# Patient Record
Sex: FEMALE | Race: BLACK OR AFRICAN AMERICAN | NOT HISPANIC OR LATINO | Employment: UNEMPLOYED | URBAN - METROPOLITAN AREA
[De-identification: names, ages, dates, MRNs, and addresses within clinical notes are randomized per-mention and may not be internally consistent; named-entity substitution may affect disease eponyms.]

---
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
## 2024-04-04 ENCOUNTER — HOSPITAL ENCOUNTER (EMERGENCY)
Facility: HOSPITAL | Age: 19
Discharge: HOME | End: 2024-04-05
Attending: EMERGENCY MEDICINE
Payer: MEDICAID

## 2024-04-04 DIAGNOSIS — Z20.2 SEXUALLY TRANSMITTED DISEASE EXPOSURE: ICD-10-CM

## 2024-04-04 DIAGNOSIS — N89.8 VAGINAL DISCHARGE: Primary | ICD-10-CM

## 2024-04-04 PROCEDURE — 99284 EMERGENCY DEPT VISIT MOD MDM: CPT

## 2024-04-05 VITALS
RESPIRATION RATE: 18 BRPM | DIASTOLIC BLOOD PRESSURE: 62 MMHG | OXYGEN SATURATION: 98 % | BODY MASS INDEX: 19.49 KG/M2 | TEMPERATURE: 98 F | SYSTOLIC BLOOD PRESSURE: 110 MMHG | HEART RATE: 74 BPM | WEIGHT: 110 LBS | HEIGHT: 63 IN

## 2024-04-05 LAB
APPEARANCE UR: ABNORMAL
B-HCG SERPL QL: NEGATIVE
BACTERIA #/AREA URNS AUTO: ABNORMAL /HPF
BILIRUB UR QL STRIP.AUTO: NEGATIVE
C TRACH DNA SPEC QL NAA+PROBE: NOT DETECTED
COLOR UR AUTO: YELLOW
GLUCOSE UR QL STRIP.AUTO: NORMAL
HYALINE CASTS #/AREA URNS LPF: ABNORMAL /LPF
KETONES UR QL STRIP.AUTO: NEGATIVE
LEUKOCYTE ESTERASE UR QL STRIP.AUTO: 500
MUCOUS THREADS URNS QL MICRO: ABNORMAL /LPF
N GONORRHOEA DNA SPEC QL NAA+PROBE: NOT DETECTED
NITRITE UR QL STRIP.AUTO: NEGATIVE
PH UR STRIP.AUTO: 7 [PH]
PROT UR QL STRIP.AUTO: ABNORMAL
RBC #/AREA URNS AUTO: ABNORMAL /HPF
RBC UR QL AUTO: ABNORMAL
SOURCE (OHS): NORMAL
SP GR UR STRIP.AUTO: 1.03 (ref 1–1.03)
SQUAMOUS #/AREA URNS LPF: ABNORMAL /HPF
UROBILINOGEN UR STRIP-ACNC: 2
WBC #/AREA URNS AUTO: ABNORMAL /HPF

## 2024-04-05 PROCEDURE — 96372 THER/PROPH/DIAG INJ SC/IM: CPT | Performed by: EMERGENCY MEDICINE

## 2024-04-05 PROCEDURE — 81001 URINALYSIS AUTO W/SCOPE: CPT | Performed by: STUDENT IN AN ORGANIZED HEALTH CARE EDUCATION/TRAINING PROGRAM

## 2024-04-05 PROCEDURE — 87086 URINE CULTURE/COLONY COUNT: CPT | Performed by: STUDENT IN AN ORGANIZED HEALTH CARE EDUCATION/TRAINING PROGRAM

## 2024-04-05 PROCEDURE — 25000003 PHARM REV CODE 250: Performed by: EMERGENCY MEDICINE

## 2024-04-05 PROCEDURE — 63600175 PHARM REV CODE 636 W HCPCS: Performed by: EMERGENCY MEDICINE

## 2024-04-05 PROCEDURE — 81025 URINE PREGNANCY TEST: CPT | Performed by: STUDENT IN AN ORGANIZED HEALTH CARE EDUCATION/TRAINING PROGRAM

## 2024-04-05 PROCEDURE — 87491 CHLMYD TRACH DNA AMP PROBE: CPT | Performed by: STUDENT IN AN ORGANIZED HEALTH CARE EDUCATION/TRAINING PROGRAM

## 2024-04-05 RX ORDER — METRONIDAZOLE 500 MG/1
500 TABLET ORAL EVERY 12 HOURS
Qty: 14 TABLET | Refills: 0 | Status: SHIPPED | OUTPATIENT
Start: 2024-04-05 | End: 2024-04-12

## 2024-04-05 RX ORDER — DOXYCYCLINE 100 MG/1
100 CAPSULE ORAL 2 TIMES DAILY
Qty: 14 CAPSULE | Refills: 0 | Status: SHIPPED | OUTPATIENT
Start: 2024-04-05 | End: 2024-04-12

## 2024-04-05 RX ORDER — WATER FOR INJECTION,STERILE
VIAL (ML) INJECTION
Status: COMPLETED
Start: 2024-04-05 | End: 2024-04-05

## 2024-04-05 RX ORDER — DOXYCYCLINE HYCLATE 100 MG
100 TABLET ORAL
Status: COMPLETED | OUTPATIENT
Start: 2024-04-05 | End: 2024-04-05

## 2024-04-05 RX ORDER — CEFTRIAXONE 1 G/1
0.5 INJECTION, POWDER, FOR SOLUTION INTRAMUSCULAR; INTRAVENOUS
Status: COMPLETED | OUTPATIENT
Start: 2024-04-05 | End: 2024-04-05

## 2024-04-05 RX ADMIN — DOXYCYCLINE HYCLATE 100 MG: 100 TABLET, COATED ORAL at 02:04

## 2024-04-05 RX ADMIN — CEFTRIAXONE SODIUM 0.5 G: 1 INJECTION, POWDER, FOR SOLUTION INTRAMUSCULAR; INTRAVENOUS at 02:04

## 2024-04-05 NOTE — ED PROVIDER NOTES
Encounter Date: 4/4/2024       History     Chief Complaint   Patient presents with    Vaginal Discharge     Vaginal odor, clear discharge, irritation x 1 week. No burning when urinating. LMP unknown currently on depo.      19 yo F presents with painless foul smelling vagnial discharge for 1 week, +unprotected intercourse. Has irregular periods, on depot unknown lmp. Here for sti check    The history is provided by the patient. No  was used.     Review of patient's allergies indicates:  No Known Allergies  No past medical history on file.  No past surgical history on file.  No family history on file.     Review of Systems   Genitourinary:  Positive for vaginal discharge.   All other systems reviewed and are negative.      Physical Exam     Initial Vitals [04/04/24 2343]   BP Pulse Resp Temp SpO2   104/68 82 18 98.4 °F (36.9 °C) 99 %      MAP       --         Physical Exam    Nursing note and vitals reviewed.  Constitutional: She appears well-developed and well-nourished. She is not diaphoretic. No distress.   HENT:   Head: Normocephalic and atraumatic.   Nose: Nose normal.   Mouth/Throat: Oropharynx is clear and moist.   Eyes: Conjunctivae and EOM are normal. Pupils are equal, round, and reactive to light.   Neck: Trachea normal. Neck supple.   Normal range of motion.  Cardiovascular:  Normal rate, regular rhythm, normal heart sounds and intact distal pulses.           No murmur heard.  Pulmonary/Chest: Breath sounds normal. No respiratory distress. She has no wheezes. She has no rhonchi. She has no rales. She exhibits no tenderness.   Abdominal: Abdomen is soft. Bowel sounds are normal. She exhibits no distension and no mass. There is no abdominal tenderness. There is no rebound and no guarding.   Genitourinary:    Genitourinary Comments: See ed course     Musculoskeletal:         General: No tenderness or edema. Normal range of motion.      Cervical back: Normal range of motion and neck supple.       Lumbar back: Normal. Normal range of motion.     Neurological: She is alert and oriented to person, place, and time. She has normal strength. No cranial nerve deficit or sensory deficit.   Skin: Skin is warm and dry. Capillary refill takes less than 2 seconds. No abscess noted. No erythema. No pallor.   Psychiatric: She has a normal mood and affect. Her behavior is normal. Judgment and thought content normal.         ED Course   Procedures  Labs Reviewed   URINALYSIS, REFLEX TO URINE CULTURE - Abnormal; Notable for the following components:       Result Value    Appearance, UA Turbid (*)     Protein, UA Trace (*)     Blood, UA 2+ (*)     Urobilinogen, UA 2.0 (*)     Leukocyte Esterase,  (*)     WBC, UA 21-50 (*)     Bacteria, UA Occasional (*)     Squamous Epithelial Cells, UA Occasional (*)     Mucous, UA Trace (*)     Hyaline Casts, UA 0-2 (*)     All other components within normal limits   PREGNANCY TEST, URINE RAPID - Normal   CHLAMYDIA/GONORRHOEAE(GC), PCR    Narrative:     The Xpert CT/NG test, performed on the [employer] system is a qualitative in vitro real-time polymerase chain reaction (PCR) test for the automated detected and differentiation for genomic DNA from Chlamydia trachomatis (CT) and/or Neisseria gonorrhoeae (NG).   CULTURE, URINE          Imaging Results    None          Medications   cefTRIAXone injection 0.5 g (0.5 g Intramuscular Given 4/5/24 0228)   doxycycline tablet 100 mg (100 mg Oral Given 4/5/24 0228)   sterile water for injection injection (  Override Pull 4/5/24 0230)     Medical Decision Making  Given patient's presentation, differential diagnosis includes but is not limited to uti, pregnancy, sti  To evaluate these  possible etiologies ua, upt, gc/cz were ordered and reviewed  Urine contaminated  Treated ofr sti exposure  No evidence of pid on exam    Problems Addressed:  Sexually transmitted disease exposure: acute illness or injury that poses a threat to life or bodily  functions  Vaginal discharge: acute illness or injury that poses a threat to life or bodily functions    Amount and/or Complexity of Data Reviewed  External Data Reviewed: notes.     Details: Previous urgent care visits for sti eval  Labs: ordered.    Risk  OTC drugs.  Prescription drug management.               ED Course as of 04/05/24 0259   Fri Apr 05, 2024 0142 Pelvic exam performed with [facility] ERT as chaperone, foul smelling brownish discharge with no CMT [BS]      ED Course User Index  [BS] Adwoa Hills MD                           Clinical Impression:  Final diagnoses:  [N89.8] Vaginal discharge (Primary)  [Z20.2] Sexually transmitted disease exposure          ED Disposition Condition    Discharge Stable          ED Prescriptions       Medication Sig Dispense Start Date End Date Auth. Provider    doxycycline (VIBRAMYCIN) 100 MG Cap Take 1 capsule (100 mg total) by mouth 2 (two) times daily. for 7 days 14 capsule 4/5/2024 4/12/2024 Adwoa Hills MD    metroNIDAZOLE (FLAGYL) 500 MG tablet Take 1 tablet (500 mg total) by mouth every 12 (twelve) hours. for 7 days 14 tablet 4/5/2024 4/12/2024 Adwoa Hills MD          Follow-up Information       Follow up With Specialties Details Why Contact Info    Howie Hernandez MD Pediatrics Schedule an appointment as soon as possible for a visit   [address] [place] [id number] [phone]      [facility] - Emergency Dept Emergency Medicine  As needed, If symptoms worsen [address] [facility] [id number] [phone]             Adwoa Hills MD  04/05/24 0259

## 2024-04-07 LAB — BACTERIA UR CULT: NORMAL

## 2024-04-29 ENCOUNTER — HOSPITAL ENCOUNTER (EMERGENCY)
Facility: HOSPITAL | Age: 19
Discharge: HOME | End: 2024-04-29
Attending: STUDENT IN AN ORGANIZED HEALTH CARE EDUCATION/TRAINING PROGRAM
Payer: MEDICAID

## 2024-04-29 VITALS
WEIGHT: 110 LBS | TEMPERATURE: 100 F | DIASTOLIC BLOOD PRESSURE: 70 MMHG | SYSTOLIC BLOOD PRESSURE: 118 MMHG | BODY MASS INDEX: 20.24 KG/M2 | OXYGEN SATURATION: 97 % | RESPIRATION RATE: 18 BRPM | HEIGHT: 62 IN | HEART RATE: 94 BPM

## 2024-04-29 DIAGNOSIS — L02.31 ABSCESS OF RIGHT BUTTOCK: Primary | ICD-10-CM

## 2024-04-29 PROCEDURE — 25000003 PHARM REV CODE 250

## 2024-04-29 PROCEDURE — 99284 EMERGENCY DEPT VISIT MOD MDM: CPT | Mod: 25

## 2024-04-29 PROCEDURE — 10060 I&D ABSCESS SIMPLE/SINGLE: CPT

## 2024-04-29 RX ORDER — LIDOCAINE HYDROCHLORIDE 10 MG/ML
5 INJECTION, SOLUTION EPIDURAL; INFILTRATION; INTRACAUDAL; PERINEURAL
Status: COMPLETED | OUTPATIENT
Start: 2024-04-29 | End: 2024-04-29

## 2024-04-29 RX ORDER — SULFAMETHOXAZOLE AND TRIMETHOPRIM 800; 160 MG/1; MG/1
1 TABLET ORAL 2 TIMES DAILY
Qty: 14 TABLET | Refills: 0 | Status: SHIPPED | OUTPATIENT
Start: 2024-04-29 | End: 2024-05-06

## 2024-04-29 RX ORDER — DICLOFENAC SODIUM 75 MG/1
75 TABLET, DELAYED RELEASE ORAL 2 TIMES DAILY PRN
Qty: 20 TABLET | Refills: 0 | Status: SHIPPED | OUTPATIENT
Start: 2024-04-29

## 2024-04-29 RX ORDER — TRAMADOL HYDROCHLORIDE 50 MG/1
50 TABLET ORAL EVERY 6 HOURS PRN
Qty: 12 TABLET | Refills: 0 | Status: SHIPPED | OUTPATIENT
Start: 2024-04-29 | End: 2024-05-02

## 2024-04-29 RX ADMIN — LIDOCAINE HYDROCHLORIDE 50 MG: 10 INJECTION, SOLUTION EPIDURAL; INFILTRATION; INTRACAUDAL; PERINEURAL at 10:04

## 2024-04-30 NOTE — ED NOTES
Lobby Round. Pt alert and oriented at this time in main lobby. Vitals assessed. Pt reports no changes from initial triage. Pt does not appear to be in any immediate distress at this time.

## 2024-04-30 NOTE — ED PROVIDER NOTES
"Encounter Date: 4/29/2024       History     Chief Complaint   Patient presents with    Abscess     Boil to "crack" of buttocks noticed yesterday. Denies fever or drainage     The patient is a 18 y.o. female who presents to the Emergency Department with a chief complaint of abscess to right buttock. Symptoms began 3 days ago and have been constant since onset. Her pain is currently rated as a 6/10 in severity and described as aching with no radiation. Associated symptoms include nothing. Symptoms are aggravated with movement and there are no alleviating factors. The patient denies fever, chills, or drainage. She reports taking nothing prior to arrival with no relief of symptoms. No other reported symptoms at this time.      The history is provided by the patient. No  was used.   Abscess   This is a new problem. The current episode started several days ago. The problem occurs rarely. The problem has been unchanged. The abscess is present on the right buttock. The pain is at a severity of 6/10. Pertinent negatives include no fever and no sore throat. She has received no recent medical care.     Review of patient's allergies indicates:  No Known Allergies  No past medical history on file.  No past surgical history on file.  No family history on file.  Social History     Tobacco Use    Smoking status: Never    Smokeless tobacco: Never     Review of Systems   Constitutional:  Negative for fever.   HENT:  Negative for sore throat.    Respiratory:  Negative for shortness of breath.    Cardiovascular:  Negative for chest pain.   Gastrointestinal:  Negative for nausea.   Genitourinary:  Negative for dysuria.   Musculoskeletal:  Negative for back pain.   Skin:  Negative for rash.        Buttock abscess   Neurological:  Negative for weakness.   Hematological:  Does not bruise/bleed easily.   All other systems reviewed and are negative.      Physical Exam     Initial Vitals [04/29/24 2014]   BP Pulse Resp " Temp SpO2   99/68 102 20 99.6 °F (37.6 °C) 97 %      MAP       --         Physical Exam    Nursing note and vitals reviewed.  Constitutional: She appears well-developed and well-nourished.   HENT:   Head: Normocephalic.   Right Ear: Hearing and tympanic membrane normal.   Left Ear: Hearing and tympanic membrane normal.   Mouth/Throat: Uvula is midline, oropharynx is clear and moist and mucous membranes are normal.   Cardiovascular:  Normal rate, regular rhythm, normal heart sounds and normal pulses.           Pulmonary/Chest: Effort normal and breath sounds normal.     Neurological: She is alert. GCS eye subscore is 4. GCS verbal subscore is 5. GCS motor subscore is 6.   Skin: Skin is warm, dry and intact. Capillary refill takes less than 2 seconds.   Raised area of fluctuance to right lower buttock on along medial aspect. There is no drainage present.         ED Course   I & D - Incision and Drainage    Date/Time: 4/29/2024 10:40 PM  Location procedure was performed: [facility] EMERGENCY DEPARTMENT    Performed by: Shayy Caldwell NP  Authorized by: Shayy Caldwell NP  Type: abscess  Location: Right buttock.    Anesthesia:  Local Anesthetic: lidocaine 1% without epinephrine  Scalpel size: 11  Incision type: single straight  Complexity: simple  Drainage: pus  Drainage amount: copious  Wound treatment: incision and drainage  Packing material: 1/4 in iodoform gauze  Complications: No  Specimens: No  Implants: No  Patient tolerance: Patient tolerated the procedure well with no immediate complications        Labs Reviewed - No data to display       Imaging Results    None          Medications   LIDOcaine (PF) 10 mg/ml (1%) injection 50 mg (has no administration in time range)     Medical Decision Making  The patient is a 18 y.o. female who presents to the Emergency Department with a chief complaint of abscess to right buttock. Symptoms began 3 days ago and have been constant since onset. Her pain is currently rated as a  6/10 in severity and described as aching with no radiation. Associated symptoms include nothing. Symptoms are aggravated with movement and there are no alleviating factors. The patient denies fever, chills, or drainage. She reports taking nothing prior to arrival with no relief of symptoms. No other reported symptoms at this time.      Differential diagnosis include but are not limited to abscess, folliculitis     Problems Addressed:  Abscess of right buttock: acute illness or injury    Risk  Prescription drug management.               ED Course as of 04/29/24 2258 Mon Apr 29, 2024 2254 I&D performed. Patient tolerated will. Copious amount of purulent drainage. Site packed [LM]      ED Course User Index  [LM] Shayy Caldwell NP                           Clinical Impression:  Final diagnoses:  [L02.31] Abscess of right buttock (Primary)          ED Disposition Condition    Discharge Stable          ED Prescriptions       Medication Sig Dispense Start Date End Date Auth. Provider    sulfamethoxazole-trimethoprim 800-160mg (BACTRIM DS) 800-160 mg Tab Take 1 tablet by mouth 2 (two) times daily. for 7 days 14 tablet 4/29/2024 5/6/2024 Shayy Caldwell NP    diclofenac (VOLTAREN) 75 MG EC tablet Take 1 tablet (75 mg total) by mouth 2 (two) times daily as needed (Pain). 20 tablet 4/29/2024 -- Shayy Caldwell NP    traMADoL (ULTRAM) 50 mg tablet Take 1 tablet (50 mg total) by mouth every 6 (six) hours as needed for Pain. 12 tablet 4/29/2024 5/2/2024 Shayy Caldwell NP          Follow-up Information       Follow up With Specialties Details Why Contact Info    Howie Hernandez MD Pediatrics Schedule an appointment as soon as possible for a visit   [address] [place] [address] [phone]               Shayy Caldwell NP  04/29/24 3915

## 2024-06-27 ENCOUNTER — HOSPITAL ENCOUNTER (EMERGENCY)
Facility: HOSPITAL | Age: 19
Discharge: HOME | End: 2024-06-28
Attending: FAMILY MEDICINE
Payer: MEDICAID

## 2024-06-27 DIAGNOSIS — A54.9 GONORRHEA: Primary | ICD-10-CM

## 2024-06-27 LAB
B-HCG UR QL: NEGATIVE
BACTERIA #/AREA URNS AUTO: ABNORMAL /HPF
BILIRUB UR QL STRIP.AUTO: NEGATIVE
C TRACH DNA SPEC QL NAA+PROBE: NOT DETECTED
CLARITY UR: CLEAR
CLUE CELLS VAG QL WET PREP: NORMAL
COLOR UR AUTO: YELLOW
CTP QC/QA: YES
GLUCOSE UR QL STRIP: NORMAL
HGB UR QL STRIP: NEGATIVE
HYALINE CASTS #/AREA URNS LPF: ABNORMAL /LPF
KETONES UR QL STRIP: NEGATIVE
LEUKOCYTE ESTERASE UR QL STRIP: NEGATIVE
MUCOUS THREADS URNS QL MICRO: ABNORMAL /LPF
N GONORRHOEA DNA SPEC QL NAA+PROBE: DETECTED
NITRITE UR QL STRIP: NEGATIVE
PH UR STRIP: 7 [PH]
PROT UR QL STRIP: ABNORMAL
RBC #/AREA URNS AUTO: ABNORMAL /HPF
SOURCE (OHS): ABNORMAL
SP GR UR STRIP.AUTO: 1.03 (ref 1–1.03)
SQUAMOUS #/AREA URNS LPF: ABNORMAL /HPF
T VAGINALIS VAG QL WET PREP: NORMAL
UROBILINOGEN UR STRIP-ACNC: NORMAL
WBC #/AREA URNS AUTO: ABNORMAL /HPF
WBC #/AREA VAG WET PREP: NORMAL
YEAST SPEC QL WET PREP: NORMAL

## 2024-06-27 PROCEDURE — 87491 CHLMYD TRACH DNA AMP PROBE: CPT | Performed by: NURSE PRACTITIONER

## 2024-06-27 PROCEDURE — 99284 EMERGENCY DEPT VISIT MOD MDM: CPT

## 2024-06-27 PROCEDURE — 81025 URINE PREGNANCY TEST: CPT | Performed by: NURSE PRACTITIONER

## 2024-06-27 PROCEDURE — 81001 URINALYSIS AUTO W/SCOPE: CPT | Performed by: NURSE PRACTITIONER

## 2024-06-27 PROCEDURE — 87591 N.GONORRHOEAE DNA AMP PROB: CPT | Performed by: NURSE PRACTITIONER

## 2024-06-27 PROCEDURE — 87210 SMEAR WET MOUNT SALINE/INK: CPT | Performed by: NURSE PRACTITIONER

## 2024-06-27 RX ORDER — CEFTRIAXONE 500 MG/1
500 INJECTION, POWDER, FOR SOLUTION INTRAMUSCULAR; INTRAVENOUS
Status: COMPLETED | OUTPATIENT
Start: 2024-06-28 | End: 2024-06-28

## 2024-06-27 RX ORDER — LIDOCAINE HYDROCHLORIDE 10 MG/ML
2 INJECTION, SOLUTION EPIDURAL; INFILTRATION; INTRACAUDAL; PERINEURAL
Status: COMPLETED | OUTPATIENT
Start: 2024-06-28 | End: 2024-06-28

## 2024-06-28 VITALS
OXYGEN SATURATION: 99 % | DIASTOLIC BLOOD PRESSURE: 65 MMHG | WEIGHT: 107.38 LBS | SYSTOLIC BLOOD PRESSURE: 102 MMHG | HEIGHT: 62 IN | HEART RATE: 73 BPM | BODY MASS INDEX: 19.76 KG/M2 | TEMPERATURE: 98 F | RESPIRATION RATE: 20 BRPM

## 2024-06-28 PROCEDURE — 63600175 PHARM REV CODE 636 W HCPCS: Performed by: NURSE PRACTITIONER

## 2024-06-28 PROCEDURE — 25000003 PHARM REV CODE 250: Performed by: NURSE PRACTITIONER

## 2024-06-28 PROCEDURE — 96372 THER/PROPH/DIAG INJ SC/IM: CPT | Performed by: NURSE PRACTITIONER

## 2024-06-28 RX ORDER — DOXYCYCLINE 100 MG/1
100 CAPSULE ORAL EVERY 12 HOURS
Qty: 14 CAPSULE | Refills: 0 | Status: SHIPPED | OUTPATIENT
Start: 2024-06-28 | End: 2024-07-05

## 2024-06-28 RX ADMIN — LIDOCAINE HYDROCHLORIDE 20 MG: 10 INJECTION, SOLUTION EPIDURAL; INFILTRATION; INTRACAUDAL; PERINEURAL at 12:06

## 2024-06-28 RX ADMIN — CEFTRIAXONE SODIUM 500 MG: 500 INJECTION, POWDER, FOR SOLUTION INTRAMUSCULAR; INTRAVENOUS at 12:06

## 2024-06-28 NOTE — ED PROVIDER NOTES
Encounter Date: 6/27/2024       History     Chief Complaint   Patient presents with    Exposure to STD     The patient presents with vaginal discharge.  The onset was 3 days ago.  The course/duration of symptoms is constant.  Location: vagina. Radiating pain: none. The character of symptoms is discharge: white.  The degree of symptoms is minimal.  Risk factors consist of none.  Prior episodes: none.  Therapy today: none.  Reports unprotected intercourse. Associated symptoms: denies fever, denies chills, denies nausea, denies vomiting, denies abdominal pain, denies dysuria and denies rash. Additional history: none.         Review of patient's allergies indicates:  No Known Allergies  History reviewed. No pertinent past medical history.  History reviewed. No pertinent surgical history.  No family history on file.  Social History     Tobacco Use    Smoking status: Never    Smokeless tobacco: Never     Review of Systems   Constitutional:  Negative for fever.   HENT:  Negative for sore throat.    Respiratory:  Negative for shortness of breath.    Cardiovascular:  Negative for chest pain.   Gastrointestinal:  Negative for nausea.   Genitourinary:  Positive for vaginal discharge. Negative for dysuria.   Musculoskeletal:  Negative for back pain.   Skin:  Negative for rash.   Neurological:  Negative for weakness.   Hematological:  Does not bruise/bleed easily.   All other systems reviewed and are negative.      Physical Exam     Initial Vitals [06/27/24 2134]   BP Pulse Resp Temp SpO2   102/65 73 20 98.3 °F (36.8 °C) 99 %      MAP       --         Physical Exam    Nursing note and vitals reviewed.  Constitutional: She appears well-developed and well-nourished.   HENT:   Head: Normocephalic and atraumatic.   Neck: Neck supple.   Normal range of motion.  Cardiovascular:  Normal rate, regular rhythm, normal heart sounds and intact distal pulses.           Pulmonary/Chest: Effort normal and breath sounds normal.   Abdominal:  Abdomen is soft and flat. Bowel sounds are normal. There is no abdominal tenderness.   Genitourinary:    Genitourinary Comments: Pelvic exam deferred at patient's request, she wishes to self swab     Musculoskeletal:         General: Normal range of motion.      Cervical back: Normal range of motion and neck supple.     Neurological: She is alert. She has normal strength.   Skin: Skin is warm and dry.   Psychiatric: She has a normal mood and affect.         ED Course   Procedures  Labs Reviewed   CHLAMYDIA/GONORRHOEAE(GC), PCR - Abnormal; Notable for the following components:       Result Value    N. gonorrhea PCR Detected (*)     All other components within normal limits    Narrative:     The Xpert CT/NG test, performed on the [facility] system is a qualitative in vitro real-time polymerase chain reaction (PCR) test for the automated detected and differentiation for genomic DNA from Chlamydia trachomatis (CT) and/or Neisseria gonorrhoeae (NG).   URINALYSIS, REFLEX TO URINE CULTURE - Abnormal; Notable for the following components:    Protein, UA Trace (*)     Squamous Epithelial Cells, UA Many (*)     Mucous, UA Moderate (*)     All other components within normal limits   WET PREP, GENITAL - Normal   POCT URINE PREGNANCY          Imaging Results    None          Medications   LIDOcaine (PF) 10 mg/ml (1%) injection 20 mg (has no administration in time range)   cefTRIAXone injection 500 mg (has no administration in time range)     Medical Decision Making  The patient presents with vaginal discharge.  The onset was 3 days ago.  The course/duration of symptoms is constant.  Location: vagina. Radiating pain: none. The character of symptoms is discharge: white.  The degree of symptoms is minimal.  Risk factors consist of none.  Prior episodes: none.  Therapy today: none.  Reports unprotected intercourse. Associated symptoms: denies fever, denies chills, denies nausea, denies vomiting, denies abdominal pain, denies dysuria  and denies rash. Additional history: none.     12:08 AM DISPOSITION: The patient is resting comfortably in no acute distress.  She is hemodynamically stable and is without objective evidence for acute process requiring urgent intervention or hospitalization. I provided counseling to patient with regard to condition, the treatment plan, specific conditions for return, and the importance of follow up. Detailed written and verbal instructions provided to patient and she expressed a verbal understanding of the discharge instructions and overall management plan. Reiterated the importance of medication administration and safety and advised patient to follow up with primary care provider in 3-5 days or sooner if needed.  Answered questions at this time. The patient is stable for discharge.       Amount and/or Complexity of Data Reviewed  Labs: ordered.    Risk  Prescription drug management.      Additional MDM:   Differential Diagnosis:   At this time differential diagnosis is but not limited to gonorrhea, chlamydia, trichomoniasis, candidiasis                ED Course as of 06/28/24 0008   Fri Jun 28, 2024   0007 N. gonorrhea PCR(!): Detected [RB]   0007 Given strict ED return precautions. I have spoken with the patient and/or caregivers. I have explained the patient's condition, diagnoses and treatment plan based on the information available to me at this time. I have answered the patient's and/or caregiver's questions and addressed any concerns. The patient and/or caregivers have as good an understanding of the patient's diagnosis, condition and treatment plan as can be expected at this point. The vital signs have been stable. The patient's condition is stable and appropriate for discharge from the emergency department.      The patient will pursue further outpatient evaluation with the primary care physician or other designated or consulting physician as outlined in the discharge instructions. The patient and/or  caregivers are agreeable to this plan of care and follow-up instructions have been explained in detail. The patient and/or caregivers have received these instructions in written format and have expressed an understanding of the discharge instructions. The patient and/or caregivers are aware that any significant change in condition or worsening of symptoms should prompt an immediate return to this or the closest emergency department or a call to 911.      [RB]      ED Course User Index  [RB] Aaron Delong ACNP                           Clinical Impression:  Final diagnoses:  [A54.9] Gonorrhea (Primary)          ED Disposition Condition    Discharge Stable          ED Prescriptions       Medication Sig Dispense Start Date End Date Auth. Provider    doxycycline (VIBRAMYCIN) 100 MG Cap Take 1 capsule (100 mg total) by mouth every 12 (twelve) hours. for 7 days 14 capsule 6/28/2024 7/5/2024 Aaron Delong ACNP          Follow-up Information       Follow up With Specialties Details Why Contact Info    Howie Hernandez MD Pediatrics In 3 days  [address] [facility] [address]  [phone]      [facility] Emergency Medicine  If symptoms worsen [address] [address] [phone]    [facility]   follow up in 3-5 days [address] [facility], [facility] [place] [place] [address]. [phone]             Aaron Delong ACNP  06/28/24 0008

## 2024-07-30 ENCOUNTER — HOSPITAL ENCOUNTER (EMERGENCY)
Facility: HOSPITAL | Age: 19
Discharge: HOME | End: 2024-07-30
Attending: STUDENT IN AN ORGANIZED HEALTH CARE EDUCATION/TRAINING PROGRAM
Payer: MEDICAID

## 2024-07-30 VITALS
BODY MASS INDEX: 20.06 KG/M2 | RESPIRATION RATE: 16 BRPM | DIASTOLIC BLOOD PRESSURE: 67 MMHG | SYSTOLIC BLOOD PRESSURE: 107 MMHG | TEMPERATURE: 98 F | HEIGHT: 62 IN | HEART RATE: 64 BPM | OXYGEN SATURATION: 99 % | WEIGHT: 109 LBS

## 2024-07-30 DIAGNOSIS — R11.0 NAUSEA: Primary | ICD-10-CM

## 2024-07-30 DIAGNOSIS — R53.1 WEAKNESS: ICD-10-CM

## 2024-07-30 LAB
ALBUMIN SERPL-MCNC: 4.1 G/DL (ref 3.5–5)
ALBUMIN/GLOB SERPL: 1.5 RATIO (ref 1.1–2)
ALP SERPL-CCNC: 56 UNIT/L (ref 40–150)
ALT SERPL-CCNC: 8 UNIT/L (ref 0–55)
ANION GAP SERPL CALC-SCNC: 5 MEQ/L
AST SERPL-CCNC: 13 UNIT/L (ref 5–34)
B-HCG UR QL: NEGATIVE
BACTERIA #/AREA URNS AUTO: NORMAL /HPF
BASOPHILS # BLD AUTO: 0.04 X10(3)/MCL
BASOPHILS NFR BLD AUTO: 0.9 %
BILIRUB SERPL-MCNC: 0.6 MG/DL
BILIRUB UR QL STRIP.AUTO: NEGATIVE
BUN SERPL-MCNC: 9.9 MG/DL (ref 7–18.7)
CALCIUM SERPL-MCNC: 9.3 MG/DL (ref 8.4–10.2)
CHLORIDE SERPL-SCNC: 110 MMOL/L (ref 98–107)
CLARITY UR: CLEAR
CO2 SERPL-SCNC: 26 MMOL/L (ref 22–29)
COLOR UR AUTO: COLORLESS
CREAT SERPL-MCNC: 0.71 MG/DL (ref 0.55–1.02)
CREAT/UREA NIT SERPL: 14
EOSINOPHIL # BLD AUTO: 0.08 X10(3)/MCL (ref 0–0.9)
EOSINOPHIL NFR BLD AUTO: 1.8 %
ERYTHROCYTE [DISTWIDTH] IN BLOOD BY AUTOMATED COUNT: 12.6 % (ref 11.5–17)
GFR SERPLBLD CREATININE-BSD FMLA CKD-EPI: >60 ML/MIN/1.73/M2
GLOBULIN SER-MCNC: 2.7 GM/DL (ref 2.4–3.5)
GLUCOSE SERPL-MCNC: 76 MG/DL (ref 74–100)
GLUCOSE UR QL STRIP: NORMAL
HCT VFR BLD AUTO: 36.5 % (ref 37–47)
HGB BLD-MCNC: 11.8 G/DL (ref 12–16)
HGB UR QL STRIP: NEGATIVE
IMM GRANULOCYTES # BLD AUTO: 0.01 X10(3)/MCL (ref 0–0.04)
IMM GRANULOCYTES NFR BLD AUTO: 0.2 %
KETONES UR QL STRIP: NEGATIVE
LEUKOCYTE ESTERASE UR QL STRIP: NEGATIVE
LYMPHOCYTES # BLD AUTO: 1.05 X10(3)/MCL (ref 0.6–4.6)
LYMPHOCYTES NFR BLD AUTO: 23.4 %
MCH RBC QN AUTO: 29.2 PG (ref 27–31)
MCHC RBC AUTO-ENTMCNC: 32.3 G/DL (ref 33–36)
MCV RBC AUTO: 90.3 FL (ref 80–94)
MONOCYTES # BLD AUTO: 0.48 X10(3)/MCL (ref 0.1–1.3)
MONOCYTES NFR BLD AUTO: 10.7 %
NEUTROPHILS # BLD AUTO: 2.83 X10(3)/MCL (ref 2.1–9.2)
NEUTROPHILS NFR BLD AUTO: 63 %
NITRITE UR QL STRIP: NEGATIVE
NRBC BLD AUTO-RTO: 0 %
PH UR STRIP: 8 [PH]
PLATELET # BLD AUTO: 216 X10(3)/MCL (ref 130–400)
PMV BLD AUTO: 9.8 FL (ref 7.4–10.4)
POTASSIUM SERPL-SCNC: 3.9 MMOL/L (ref 3.5–5.1)
PROT SERPL-MCNC: 6.8 GM/DL (ref 6.4–8.3)
PROT UR QL STRIP: NEGATIVE
RBC # BLD AUTO: 4.04 X10(6)/MCL (ref 4.2–5.4)
RBC #/AREA URNS AUTO: NORMAL /HPF
SODIUM SERPL-SCNC: 141 MMOL/L (ref 136–145)
SP GR UR STRIP.AUTO: 1.01 (ref 1–1.03)
SQUAMOUS #/AREA URNS LPF: NORMAL /HPF
UROBILINOGEN UR STRIP-ACNC: NORMAL
WBC # BLD AUTO: 4.49 X10(3)/MCL (ref 4.5–11.5)
WBC #/AREA URNS AUTO: NORMAL /HPF

## 2024-07-30 PROCEDURE — 81025 URINE PREGNANCY TEST: CPT

## 2024-07-30 PROCEDURE — 85025 COMPLETE CBC W/AUTO DIFF WBC: CPT

## 2024-07-30 PROCEDURE — 81001 URINALYSIS AUTO W/SCOPE: CPT

## 2024-07-30 PROCEDURE — 99283 EMERGENCY DEPT VISIT LOW MDM: CPT

## 2024-07-30 PROCEDURE — 80053 COMPREHEN METABOLIC PANEL: CPT

## 2024-07-30 RX ORDER — ONDANSETRON HYDROCHLORIDE 2 MG/ML
4 INJECTION, SOLUTION INTRAVENOUS
Status: DISCONTINUED | OUTPATIENT
Start: 2024-07-30 | End: 2024-07-30

## 2024-07-31 NOTE — ED PROVIDER NOTES
Encounter Date: 7/30/2024       History     Chief Complaint   Patient presents with    Nausea     Pt states nausea, dizziness and weakness x1 week with headache. Pt denies any fever, vomiting, diarrhea, dysuria or other recent illnesses.      See MDM    The history is provided by the patient. No  was used.     Review of patient's allergies indicates:  No Known Allergies  Past Medical History:   Diagnosis Date    Known health problems: none      Past Surgical History:   Procedure Laterality Date    none       No family history on file.  Social History     Tobacco Use    Smoking status: Never    Smokeless tobacco: Never   Substance Use Topics    Alcohol use: Not Currently    Drug use: Not Currently     Review of Systems   Constitutional:  Negative for fever.   Respiratory:  Negative for cough and shortness of breath.    Cardiovascular:  Negative for chest pain.   Gastrointestinal:  Positive for nausea. Negative for abdominal pain.   Genitourinary:  Negative for difficulty urinating and dysuria.   Musculoskeletal:  Negative for gait problem.   Skin:  Negative for color change.   Neurological:  Positive for dizziness and weakness. Negative for speech difficulty and headaches.   Psychiatric/Behavioral:  Negative for hallucinations and suicidal ideas.    All other systems reviewed and are negative.      Physical Exam     Initial Vitals [07/30/24 1929]   BP Pulse Resp Temp SpO2   104/68 72 18 97.8 °F (36.6 °C) 99 %      MAP       --         Physical Exam    Nursing note and vitals reviewed.  Constitutional: She appears well-developed and well-nourished.   HENT:   Head: Normocephalic.   Eyes: EOM are normal.   Neck:   Normal range of motion.  Cardiovascular:  Normal rate, regular rhythm, normal heart sounds and intact distal pulses.           Pulmonary/Chest: Breath sounds normal. No respiratory distress.   Abdominal: Abdomen is soft. Bowel sounds are normal. There is no abdominal tenderness.    Musculoskeletal:         General: Normal range of motion.      Cervical back: Normal range of motion.     Neurological: She is alert and oriented to person, place, and time. She has normal strength.   Skin: Skin is warm and dry.   Psychiatric: She has a normal mood and affect. Her behavior is normal. Judgment and thought content normal.         ED Course   Procedures  Labs Reviewed   COMPREHENSIVE METABOLIC PANEL - Abnormal       Result Value    Sodium 141      Potassium 3.9      Chloride 110 (*)     CO2 26      Glucose 76      Blood Urea Nitrogen 9.9      Creatinine 0.71      Calcium 9.3      Protein Total 6.8      Albumin 4.1      Globulin 2.7      Albumin/Globulin Ratio 1.5      Bilirubin Total 0.6      ALP 56      ALT 8      AST 13      eGFR >60      Anion Gap 5.0      BUN/Creatinine Ratio 14     CBC WITH DIFFERENTIAL - Abnormal    WBC 4.49 (*)     RBC 4.04 (*)     Hgb 11.8 (*)     Hct 36.5 (*)     MCV 90.3      MCH 29.2      MCHC 32.3 (*)     RDW 12.6      Platelet 216      MPV 9.8      Neut % 63.0      Lymph % 23.4      Mono % 10.7      Eos % 1.8      Basophil % 0.9      Lymph # 1.05      Neut # 2.83      Mono # 0.48      Eos # 0.08      Baso # 0.04      IG# 0.01      IG% 0.2      NRBC% 0.0     URINALYSIS, REFLEX TO URINE CULTURE - Normal    Color, UA Colorless      Appearance, UA Clear      Specific Gravity, UA 1.015      pH, UA 8.0      Protein, UA Negative      Glucose, UA Normal      Ketones, UA Negative      Blood, UA Negative      Bilirubin, UA Negative      Urobilinogen, UA Normal      Nitrites, UA Negative      Leukocyte Esterase, UA Negative      RBC, UA 0-5      WBC, UA 0-5      Bacteria, UA None Seen      Squamous Epithelial Cells, UA Trace     PREGNANCY TEST, URINE RAPID - Normal    hCG Qualitative, Urine Negative     CBC W/ AUTO DIFFERENTIAL    Narrative:     The following orders were created for panel order CBC auto differential.  Procedure                               Abnormality          Status                     ---------                               -----------         ------                     CBC with Differential[[id number]]       Abnormal            Final result                 Please view results for these tests on the individual orders.          Imaging Results    None          Medications - No data to display  Medical Decision Making  Historian:  Patient.  Patient is a Black or  18 y.o. female that presents with nausea, dizziness, weakness that has been present 1 week. Associated symptoms nothing. Surrounding information is patient states that symptoms resolved and wanted to know if she was pregnant. Exacerbated by nothing. Relieved by nothing. Patient treatment prior to arrival nothing. Risk factors include none. Other history pertaining to this complaint none.   Assessment:  See physical exam.  DD:  Anemia, electrolyte imbalance, pregnancy  ED Course: History was obtained.  Physical was performed.  Labs were unremarkable.  Patient did not want fluids or Zofran. Medical or surgical consults:  None. Social determinants that affect healthcare:  None.       Amount and/or Complexity of Data Reviewed  Labs:      Details: Labs unremarkable                                      Clinical Impression:  Final diagnoses:  [R11.0] Nausea (Primary)  [R53.1] Weakness          ED Disposition Condition    Discharge Stable          ED Prescriptions    None       Follow-up Information       Follow up With Specialties Details Why Contact Info    Your Primary Care Provider  Call in 3 days ed follow up              Chico Lopez FNP  07/30/24 1333

## 2024-07-31 NOTE — FIRST PROVIDER EVALUATION
Medical screening examination initiated.  I have conducted a focused provider triage encounter, findings are as follows:    Brief history of present illness:  18 year old female presents to ER with c/o nausea, dizziness, and headache x 1 week.     There were no vitals filed for this visit.    Pertinent physical exam:  Awake and alert, NAD    Brief workup plan:  Labs, UA, UPT    Preliminary workup initiated; this workup will be continued and followed by the physician or advanced practice provider that is assigned to the patient when roomed.

## 2024-11-16 ENCOUNTER — HOSPITAL ENCOUNTER (EMERGENCY)
Facility: HOSPITAL | Age: 19
Discharge: HOME | End: 2024-11-16
Attending: EMERGENCY MEDICINE
Payer: MEDICAID

## 2024-11-16 VITALS
HEART RATE: 64 BPM | HEIGHT: 66 IN | DIASTOLIC BLOOD PRESSURE: 80 MMHG | SYSTOLIC BLOOD PRESSURE: 122 MMHG | OXYGEN SATURATION: 100 % | TEMPERATURE: 98 F | RESPIRATION RATE: 18 BRPM | BODY MASS INDEX: 17.36 KG/M2 | WEIGHT: 108 LBS

## 2024-11-16 DIAGNOSIS — Z20.2 POSSIBLE EXPOSURE TO STD: ICD-10-CM

## 2024-11-16 DIAGNOSIS — N89.8 VAGINAL DISCHARGE: Primary | ICD-10-CM

## 2024-11-16 LAB
B-HCG UR QL: NEGATIVE
BACTERIA #/AREA URNS AUTO: ABNORMAL /HPF
BILIRUB UR QL STRIP.AUTO: NEGATIVE
C TRACH DNA SPEC QL NAA+PROBE: DETECTED
CLARITY UR: CLEAR
CLUE CELLS VAG QL WET PREP: ABNORMAL
COLOR UR AUTO: ABNORMAL
CTP QC/QA: YES
GLUCOSE UR QL STRIP: NORMAL
HGB UR QL STRIP: NEGATIVE
HYALINE CASTS #/AREA URNS LPF: ABNORMAL /LPF
KETONES UR QL STRIP: NEGATIVE
LEUKOCYTE ESTERASE UR QL STRIP: NEGATIVE
N GONORRHOEA DNA SPEC QL NAA+PROBE: NOT DETECTED
NITRITE UR QL STRIP: NEGATIVE
PH UR STRIP: 7.5 [PH]
PROT UR QL STRIP: NEGATIVE
RBC #/AREA URNS AUTO: ABNORMAL /HPF
SOURCE (OHS): ABNORMAL
SP GR UR STRIP.AUTO: 1.03 (ref 1–1.03)
SQUAMOUS #/AREA URNS LPF: ABNORMAL /HPF
T VAGINALIS VAG QL WET PREP: ABNORMAL
UROBILINOGEN UR STRIP-ACNC: ABNORMAL
WBC #/AREA URNS AUTO: ABNORMAL /HPF
WBC #/AREA VAG WET PREP: ABNORMAL
YEAST SPEC QL WET PREP: ABNORMAL

## 2024-11-16 PROCEDURE — 99283 EMERGENCY DEPT VISIT LOW MDM: CPT

## 2024-11-16 PROCEDURE — 87210 SMEAR WET MOUNT SALINE/INK: CPT | Performed by: PHYSICIAN ASSISTANT

## 2024-11-16 PROCEDURE — 81001 URINALYSIS AUTO W/SCOPE: CPT | Performed by: PHYSICIAN ASSISTANT

## 2024-11-16 PROCEDURE — 87591 N.GONORRHOEAE DNA AMP PROB: CPT | Performed by: PHYSICIAN ASSISTANT

## 2024-11-16 PROCEDURE — 81025 URINE PREGNANCY TEST: CPT | Performed by: PHYSICIAN ASSISTANT

## 2024-11-16 RX ORDER — DOXYCYCLINE 100 MG/1
100 CAPSULE ORAL 2 TIMES DAILY
Qty: 14 CAPSULE | Refills: 0 | Status: SHIPPED | OUTPATIENT
Start: 2024-11-16 | End: 2024-11-23

## 2024-11-16 RX ORDER — CEFTRIAXONE 500 MG/1
500 INJECTION, POWDER, FOR SOLUTION INTRAMUSCULAR; INTRAVENOUS
Status: DISCONTINUED | OUTPATIENT
Start: 2024-11-16 | End: 2024-11-16 | Stop reason: HOSPADM

## 2024-11-16 NOTE — ED PROVIDER NOTES
Encounter Date: 11/16/2024       History     Chief Complaint   Patient presents with    Vaginal Discharge     Pt reports vaginal discharge and dysuria x 2 days.      Kelby Houston is a 18 y.o. female who presents to the ED complaining of vaginal itching and discharge x 2 days. Reports concern for STD exposure. Has been sexually active and partner does not use protection. Vaginal discharge is clear, however larger amount than normal. She denies dysuria, abdominal pain, fever, N/V.    The history is provided by the patient.     Review of patient's allergies indicates:  No Known Allergies  Past Medical History:   Diagnosis Date    Known health problems: none      Past Surgical History:   Procedure Laterality Date    none       No family history on file.  Social History     Tobacco Use    Smoking status: Never    Smokeless tobacco: Never   Substance Use Topics    Alcohol use: Not Currently    Drug use: Not Currently     Review of Systems   Constitutional:  Negative for fever.   HENT:  Negative for sore throat.    Respiratory:  Negative for shortness of breath.    Cardiovascular:  Negative for chest pain.   Gastrointestinal:  Negative for nausea.   Genitourinary:  Positive for vaginal discharge. Negative for dysuria.   Musculoskeletal:  Negative for back pain.   Skin:  Negative for rash.   Neurological:  Negative for weakness.   Hematological:  Does not bruise/bleed easily.       Physical Exam     Initial Vitals [11/16/24 1623]   BP Pulse Resp Temp SpO2   122/80 64 18 98.2 °F (36.8 °C) 100 %      MAP       --         Physical Exam    Nursing note and vitals reviewed.  Constitutional: She appears well-developed and well-nourished. No distress.   HENT:   Head: Normocephalic and atraumatic. Mouth/Throat: No oropharyngeal exudate.   Eyes: EOM are normal. No scleral icterus.   Neck: Neck supple.   Normal range of motion.  Cardiovascular:  Normal rate and regular rhythm.           No murmur heard.  Pulmonary/Chest: Breath  sounds normal. No respiratory distress. She has no wheezes.   Abdominal: Abdomen is soft. Bowel sounds are normal. She exhibits no distension. There is no abdominal tenderness.   Musculoskeletal:         General: No tenderness. Normal range of motion.      Cervical back: Normal range of motion and neck supple.     Neurological: She is alert and oriented to person, place, and time. No cranial nerve deficit.   Skin: Skin is warm and dry. Capillary refill takes less than 2 seconds. No erythema.   Psychiatric: She has a normal mood and affect. Her behavior is normal. Judgment and thought content normal.         ED Course   Procedures  Labs Reviewed   WET PREP, GENITAL - Abnormal       Result Value    WBC, Wet Prep Rare (*)     Clue Cells, Wet Prep None Seen      Trichomonas, Wet Prep None Seen      Yeast, Wet Prep None Seen     URINALYSIS, REFLEX TO URINE CULTURE - Abnormal    Color, UA Light-Yellow      Appearance, UA Clear      Specific Gravity, UA 1.026      pH, UA 7.5      Protein, UA Negative      Glucose, UA Normal      Ketones, UA Negative      Blood, UA Negative      Bilirubin, UA Negative      Urobilinogen, UA 1+ (*)     Nitrites, UA Negative      Leukocyte Esterase, UA Negative      RBC, UA None Seen      WBC, UA 0-5      Bacteria, UA None Seen      Squamous Epithelial Cells, UA Occasional (*)     Hyaline Casts, UA None Seen     CHLAMYDIA/GONORRHOEAE(GC), PCR   POCT URINE PREGNANCY    POC Preg Test, Ur Negative       Acceptable Yes            Imaging Results    None          Medications   cefTRIAXone injection 500 mg (has no administration in time range)     Medical Decision Making  Pt requesting prophylactic treatment for STDs. She will follow up her results on mychart. Educated on safe sex practices. No sexually activity x 2 weeks, partner needs to be treated. I advised she go to [facility] to be tested for all stds. ED return precautions given. She verbalized understanding.  All questions answered.     Amount and/or Complexity of Data Reviewed  Labs: ordered.    Risk  Prescription drug management.                                      Clinical Impression:  Final diagnoses:  [N89.8] Vaginal discharge (Primary)  [Z20.2] Possible exposure to STD          ED Disposition Condition    Discharge Stable          ED Prescriptions       Medication Sig Dispense Start Date End Date Auth. Provider    doxycycline (VIBRAMYCIN) 100 MG Cap Take 1 capsule (100 mg total) by mouth 2 (two) times daily. for 7 days 14 capsule 11/16/2024 11/23/2024 Katherine Richter PA-C          Follow-up Information       Follow up With Specialties Details Why Contact Info    [facility] Emergency Medicine  If symptoms worsen [address] [facility] [address] [phone]    [facility]  Go to  for STD testing [address]  [phone]             Katherine Richter PA-C  11/16/24 0711

## 2024-11-16 NOTE — DISCHARGE INSTRUCTIONS
You came to the emergency department today for concerns of STD exposure. You have been treated with a shot of ceftriaxone for gonorrhea and have been prescribed an oral antibiotic for chlamydia. You should still go to the [facility] or Urgent Care to be tested for all STDs. You should not partake in sexual activities until you complete your antibiotics or you risk exposing your partner. Your partner should also be tested and treated. Return to the emergency department for any new or worsening symptoms.

## 2024-12-24 ENCOUNTER — HOSPITAL ENCOUNTER (EMERGENCY)
Facility: HOSPITAL | Age: 19
Discharge: HOME | End: 2024-12-24
Attending: STUDENT IN AN ORGANIZED HEALTH CARE EDUCATION/TRAINING PROGRAM
Payer: MEDICAID

## 2024-12-24 VITALS
SYSTOLIC BLOOD PRESSURE: 111 MMHG | HEART RATE: 58 BPM | BODY MASS INDEX: 19.92 KG/M2 | HEIGHT: 62 IN | WEIGHT: 108.25 LBS | RESPIRATION RATE: 17 BRPM | DIASTOLIC BLOOD PRESSURE: 73 MMHG | OXYGEN SATURATION: 100 % | TEMPERATURE: 98 F

## 2024-12-24 DIAGNOSIS — B96.89 BACTERIAL VAGINOSIS: ICD-10-CM

## 2024-12-24 DIAGNOSIS — N76.0 BACTERIAL VAGINOSIS: ICD-10-CM

## 2024-12-24 DIAGNOSIS — Z20.2 POSSIBLE EXPOSURE TO STD: ICD-10-CM

## 2024-12-24 DIAGNOSIS — N89.8 VAGINAL DISCHARGE: Primary | ICD-10-CM

## 2024-12-24 LAB
B-HCG UR QL: NEGATIVE
BACTERIA #/AREA URNS AUTO: ABNORMAL /HPF
BILIRUB UR QL STRIP.AUTO: NEGATIVE
CLARITY UR: ABNORMAL
CLUE CELLS VAG QL WET PREP: ABNORMAL
COLOR UR AUTO: YELLOW
CTP QC/QA: YES
GLUCOSE UR QL STRIP: NORMAL
HGB UR QL STRIP: NEGATIVE
HYALINE CASTS #/AREA URNS LPF: ABNORMAL /LPF
KETONES UR QL STRIP: NEGATIVE
LEUKOCYTE ESTERASE UR QL STRIP: 500
MUCOUS THREADS URNS QL MICRO: ABNORMAL /LPF
NITRITE UR QL STRIP: NEGATIVE
PH UR STRIP: 7 [PH]
PROT UR QL STRIP: ABNORMAL
RBC #/AREA URNS AUTO: ABNORMAL /HPF
SP GR UR STRIP.AUTO: 1.03 (ref 1–1.03)
SQUAMOUS #/AREA URNS LPF: ABNORMAL /HPF
T VAGINALIS VAG QL WET PREP: ABNORMAL
UROBILINOGEN UR STRIP-ACNC: ABNORMAL
WBC #/AREA URNS AUTO: ABNORMAL /HPF
WBC #/AREA VAG WET PREP: ABNORMAL
YEAST SPEC QL WET PREP: ABNORMAL

## 2024-12-24 PROCEDURE — 96372 THER/PROPH/DIAG INJ SC/IM: CPT | Performed by: STUDENT IN AN ORGANIZED HEALTH CARE EDUCATION/TRAINING PROGRAM

## 2024-12-24 PROCEDURE — 87210 SMEAR WET MOUNT SALINE/INK: CPT | Performed by: STUDENT IN AN ORGANIZED HEALTH CARE EDUCATION/TRAINING PROGRAM

## 2024-12-24 PROCEDURE — 99284 EMERGENCY DEPT VISIT MOD MDM: CPT | Mod: 25

## 2024-12-24 PROCEDURE — 81025 URINE PREGNANCY TEST: CPT | Performed by: STUDENT IN AN ORGANIZED HEALTH CARE EDUCATION/TRAINING PROGRAM

## 2024-12-24 PROCEDURE — 81001 URINALYSIS AUTO W/SCOPE: CPT | Performed by: STUDENT IN AN ORGANIZED HEALTH CARE EDUCATION/TRAINING PROGRAM

## 2024-12-24 PROCEDURE — 87491 CHLMYD TRACH DNA AMP PROBE: CPT | Performed by: STUDENT IN AN ORGANIZED HEALTH CARE EDUCATION/TRAINING PROGRAM

## 2024-12-24 PROCEDURE — 87086 URINE CULTURE/COLONY COUNT: CPT | Performed by: STUDENT IN AN ORGANIZED HEALTH CARE EDUCATION/TRAINING PROGRAM

## 2024-12-24 PROCEDURE — 63600175 PHARM REV CODE 636 W HCPCS: Performed by: STUDENT IN AN ORGANIZED HEALTH CARE EDUCATION/TRAINING PROGRAM

## 2024-12-24 RX ORDER — METRONIDAZOLE 500 MG/1
500 TABLET ORAL EVERY 12 HOURS
Qty: 14 TABLET | Refills: 0 | Status: SHIPPED | OUTPATIENT
Start: 2024-12-24 | End: 2024-12-24

## 2024-12-24 RX ORDER — DOXYCYCLINE 100 MG/1
100 CAPSULE ORAL 2 TIMES DAILY
Qty: 14 CAPSULE | Refills: 0 | Status: SHIPPED | OUTPATIENT
Start: 2024-12-24 | End: 2024-12-24

## 2024-12-24 RX ORDER — DOXYCYCLINE 100 MG/1
100 CAPSULE ORAL 2 TIMES DAILY
Qty: 14 CAPSULE | Refills: 0 | Status: SHIPPED | OUTPATIENT
Start: 2024-12-24 | End: 2024-12-31

## 2024-12-24 RX ORDER — CEFTRIAXONE 500 MG/1
500 INJECTION, POWDER, FOR SOLUTION INTRAMUSCULAR; INTRAVENOUS
Status: COMPLETED | OUTPATIENT
Start: 2024-12-24 | End: 2024-12-24

## 2024-12-24 RX ORDER — METRONIDAZOLE 500 MG/1
500 TABLET ORAL EVERY 12 HOURS
Qty: 14 TABLET | Refills: 0 | Status: SHIPPED | OUTPATIENT
Start: 2024-12-24 | End: 2024-12-31

## 2024-12-24 RX ADMIN — CEFTRIAXONE SODIUM 500 MG: 500 INJECTION, POWDER, FOR SOLUTION INTRAMUSCULAR; INTRAVENOUS at 10:12

## 2024-12-25 LAB
C TRACH DNA SPEC QL NAA+PROBE: NOT DETECTED
N GONORRHOEA DNA SPEC QL NAA+PROBE: NOT DETECTED
SOURCE (OHS): NORMAL

## 2024-12-25 NOTE — ED PROVIDER NOTES
"Encounter Date: 12/24/2024       History     Chief Complaint   Patient presents with    Vaginal Discharge     States "chunky white" vaginal discharge and irritation x 3-4 days.       Patient presents to the emergency department due to vaginal irritation and discharge.  Has been going on for 3 days now.  She denies any pelvic pain or fevers.  Has a history of STIs in the past as well as yeast infections.    The history is provided by the patient.     Review of patient's allergies indicates:  No Known Allergies  Past Medical History:   Diagnosis Date    Known health problems: none      Past Surgical History:   Procedure Laterality Date    none       No family history on file.  Social History     Tobacco Use    Smoking status: Never    Smokeless tobacco: Never   Substance Use Topics    Alcohol use: Not Currently    Drug use: Not Currently     Review of Systems   Constitutional:  Negative for chills and fever.   HENT:  Negative for congestion and sore throat.    Respiratory:  Negative for cough and shortness of breath.    Cardiovascular:  Negative for chest pain and palpitations.   Gastrointestinal:  Negative for abdominal pain and nausea.   Genitourinary:  Positive for vaginal discharge and vaginal pain. Negative for dysuria, hematuria and pelvic pain.   Musculoskeletal:  Negative for arthralgias and myalgias.   Neurological:  Negative for dizziness and weakness.       Physical Exam     Initial Vitals [12/24/24 2146]   BP Pulse Resp Temp SpO2   111/73 (!) 58 17 98.4 °F (36.9 °C) 100 %      MAP       --         Physical Exam    Nursing note and vitals reviewed.  Constitutional: She appears well-developed and well-nourished.   HENT:   Head: Normocephalic and atraumatic.   Eyes: EOM are normal. Pupils are equal, round, and reactive to light.   Neck: Neck supple.   Normal range of motion.  Cardiovascular:  Normal rate and regular rhythm.           Pulmonary/Chest: Breath sounds normal. No respiratory distress.   Abdominal: " Abdomen is soft. There is no abdominal tenderness.   Musculoskeletal:         General: No edema. Normal range of motion.      Cervical back: Normal range of motion and neck supple.     Neurological: She is alert and oriented to person, place, and time.   Skin: Skin is warm and dry.         ED Course   Procedures  Labs Reviewed   WET PREP, GENITAL - Abnormal       Result Value    WBC, Wet Prep Moderate (*)     Clue Cells, Wet Prep Rare (*)     Trichomonas, Wet Prep None Seen      Yeast, Wet Prep None Seen     URINALYSIS, REFLEX TO URINE CULTURE - Abnormal    Color, UA Yellow      Appearance, UA Turbid (*)     Specific Gravity, UA 1.034 (*)     pH, UA 7.0      Protein, UA 1+ (*)     Glucose, UA Normal      Ketones, UA Negative      Blood, UA Negative      Bilirubin, UA Negative      Urobilinogen, UA 1+ (*)     Nitrites, UA Negative      Leukocyte Esterase,  (*)     RBC, UA 6-10 (*)     WBC, UA 21-50 (*)     Bacteria, UA Trace (*)     Squamous Epithelial Cells, UA Moderate (*)     Mucous, UA Many (*)     Hyaline Casts, UA None Seen     CHLAMYDIA/GONORRHOEAE(GC), PCR   CULTURE, URINE   POCT URINE PREGNANCY    POC Preg Test, Ur Negative       Acceptable Yes            Imaging Results    None          Medications   cefTRIAXone injection 500 mg (500 mg Intramuscular Given 12/24/24 2228)     Medical Decision Making  Clue cells present on wet prep, no yeast.  Urine consistent with a UTI.  Given IM Rocephin and doxycycline empirically, will also prescribe Flagyl for BV.  Follow up with the health unit.    Amount and/or Complexity of Data Reviewed  Labs: ordered. Decision-making details documented in ED Course.    Risk  Prescription drug management.                                      Clinical Impression:  Final diagnoses:  [N89.8] Vaginal discharge (Primary)  [N76.0, B96.89] Bacterial vaginosis  [Z20.2] Possible exposure to STD          ED Disposition Condition    Discharge Stable          ED  Prescriptions       Medication Sig Dispense Start Date End Date Auth. Provider    doxycycline (VIBRAMYCIN) 100 MG Cap Take 1 capsule (100 mg total) by mouth 2 (two) times daily. for 7 days 14 capsule 12/24/2024 12/31/2024 Juwan Young MD    metroNIDAZOLE (FLAGYL) 500 MG tablet Take 1 tablet (500 mg total) by mouth every 12 (twelve) hours. for 7 days 14 tablet 12/24/2024 12/31/2024 Juwan Young MD          Follow-up Information       Follow up With Specialties Details Why Contact Info    [facility] Emergency Medicine Go to  If symptoms worsen [address] [facility] [address] [phone]             Juwan Young MD  12/24/24 4751

## 2024-12-27 LAB — BACTERIA UR CULT: NORMAL

## 2025-03-17 ENCOUNTER — HOSPITAL ENCOUNTER (EMERGENCY)
Facility: HOSPITAL | Age: 20
Discharge: HOME | End: 2025-03-17
Attending: EMERGENCY MEDICINE
Payer: MEDICAID

## 2025-03-17 VITALS
OXYGEN SATURATION: 100 % | HEART RATE: 62 BPM | TEMPERATURE: 98 F | RESPIRATION RATE: 20 BRPM | DIASTOLIC BLOOD PRESSURE: 64 MMHG | SYSTOLIC BLOOD PRESSURE: 102 MMHG | WEIGHT: 111.81 LBS | BODY MASS INDEX: 20.45 KG/M2

## 2025-03-17 DIAGNOSIS — B96.89 BACTERIAL VAGINOSIS: Primary | ICD-10-CM

## 2025-03-17 DIAGNOSIS — N76.0 BACTERIAL VAGINOSIS: Primary | ICD-10-CM

## 2025-03-17 LAB
B-HCG UR QL: NEGATIVE
BACTERIA #/AREA URNS AUTO: ABNORMAL /HPF
BILIRUB UR QL STRIP.AUTO: NEGATIVE
C TRACH DNA SPEC QL NAA+PROBE: NOT DETECTED
CLARITY UR: CLEAR
CLUE CELLS VAG QL WET PREP: ABNORMAL
COLOR UR AUTO: ABNORMAL
CTP QC/QA: YES
GLUCOSE UR QL STRIP: NORMAL
HGB UR QL STRIP: NEGATIVE
HYALINE CASTS #/AREA URNS LPF: ABNORMAL /LPF
KETONES UR QL STRIP: NEGATIVE
LEUKOCYTE ESTERASE UR QL STRIP: NEGATIVE
MUCOUS THREADS URNS QL MICRO: ABNORMAL /LPF
N GONORRHOEA DNA SPEC QL NAA+PROBE: NOT DETECTED
NITRITE UR QL STRIP: NEGATIVE
PH UR STRIP: 7 [PH]
PROT UR QL STRIP: ABNORMAL
RBC #/AREA URNS AUTO: ABNORMAL /HPF
SOURCE (OHS): NORMAL
SP GR UR STRIP.AUTO: 1.03 (ref 1–1.03)
SQUAMOUS #/AREA URNS LPF: ABNORMAL /HPF
T VAGINALIS VAG QL WET PREP: ABNORMAL
UROBILINOGEN UR STRIP-ACNC: NORMAL
WBC #/AREA URNS AUTO: ABNORMAL /HPF
WBC #/AREA VAG WET PREP: ABNORMAL
YEAST SPEC QL WET PREP: ABNORMAL

## 2025-03-17 PROCEDURE — 87210 SMEAR WET MOUNT SALINE/INK: CPT | Performed by: NURSE PRACTITIONER

## 2025-03-17 PROCEDURE — 63600175 PHARM REV CODE 636 W HCPCS: Performed by: NURSE PRACTITIONER

## 2025-03-17 PROCEDURE — 81025 URINE PREGNANCY TEST: CPT | Performed by: NURSE PRACTITIONER

## 2025-03-17 PROCEDURE — 99284 EMERGENCY DEPT VISIT MOD MDM: CPT | Mod: 25

## 2025-03-17 PROCEDURE — 87491 CHLMYD TRACH DNA AMP PROBE: CPT | Performed by: NURSE PRACTITIONER

## 2025-03-17 PROCEDURE — 63700000 PHARM REV CODE 250 ALT 637 W/O HCPCS: Performed by: NURSE PRACTITIONER

## 2025-03-17 PROCEDURE — 96372 THER/PROPH/DIAG INJ SC/IM: CPT | Performed by: NURSE PRACTITIONER

## 2025-03-17 PROCEDURE — 81001 URINALYSIS AUTO W/SCOPE: CPT | Performed by: NURSE PRACTITIONER

## 2025-03-17 RX ORDER — AZITHROMYCIN 250 MG/1
1000 TABLET, FILM COATED ORAL
Status: COMPLETED | OUTPATIENT
Start: 2025-03-17 | End: 2025-03-17

## 2025-03-17 RX ORDER — CEFTRIAXONE 500 MG/1
500 INJECTION, POWDER, FOR SOLUTION INTRAMUSCULAR; INTRAVENOUS ONCE
Status: COMPLETED | OUTPATIENT
Start: 2025-03-17 | End: 2025-03-17

## 2025-03-17 RX ORDER — LIDOCAINE HYDROCHLORIDE 10 MG/ML
2 INJECTION, SOLUTION EPIDURAL; INFILTRATION; INTRACAUDAL; PERINEURAL
Status: COMPLETED | OUTPATIENT
Start: 2025-03-17 | End: 2025-03-17

## 2025-03-17 RX ORDER — METRONIDAZOLE 500 MG/1
500 TABLET ORAL EVERY 12 HOURS
Qty: 14 TABLET | Refills: 0 | Status: SHIPPED | OUTPATIENT
Start: 2025-03-17 | End: 2025-03-24

## 2025-03-17 RX ADMIN — CEFTRIAXONE SODIUM 500 MG: 500 INJECTION, POWDER, FOR SOLUTION INTRAMUSCULAR; INTRAVENOUS at 06:03

## 2025-03-17 RX ADMIN — LIDOCAINE HYDROCHLORIDE 2 ML: 10 INJECTION, SOLUTION EPIDURAL; INFILTRATION; INTRACAUDAL; PERINEURAL at 06:03

## 2025-03-17 RX ADMIN — AZITHROMYCIN DIHYDRATE 1000 MG: 250 TABLET ORAL at 06:03

## 2025-03-17 NOTE — DISCHARGE INSTRUCTIONS
Follow up this week with the [facility] this week for additional evaluation.  Follow up with PCP this week for further evaluation.  No sexual intercourse for the next 7 days while on medication.

## 2025-03-17 NOTE — ED PROVIDER NOTES
Encounter Date: 3/17/2025       History     Chief Complaint   Patient presents with    Vaginal Discharge     Vaginal discharge and odor x1 week     Patient is a 19-year-old female who presents for evaluation after experiencing vaginal discharge and over x1 week.  Denies known sexually transmitted disease exposure; however, she would like testing.  Denies chest pain, shortness of breath, fever, pain with urination, or loss of bowel or bladder control.  Denies chronic medical conditions.      Review of patient's allergies indicates:  No Known Allergies  Past Medical History:   Diagnosis Date    Known health problems: none      Past Surgical History:   Procedure Laterality Date    none       No family history on file.  Social History[1]  Review of Systems   Constitutional:  Negative for chills, diaphoresis, fatigue and fever.   HENT:  Negative for facial swelling, rhinorrhea, sinus pressure, sinus pain, sore throat and trouble swallowing.    Respiratory:  Negative for cough, chest tightness, shortness of breath and wheezing.    Cardiovascular:  Negative for chest pain, palpitations and leg swelling.   Gastrointestinal:  Negative for abdominal pain, diarrhea, nausea and vomiting.   Genitourinary:  Positive for vaginal discharge. Negative for dysuria, flank pain, frequency, hematuria, urgency, vaginal bleeding and vaginal pain.   Musculoskeletal:  Negative for arthralgias, back pain, joint swelling and myalgias.   Skin:  Negative for color change and rash.   Neurological:  Negative for dizziness, syncope, weakness and light-headedness.   Hematological:  Does not bruise/bleed easily.   All other systems reviewed and are negative.      Physical Exam     Initial Vitals [03/17/25 1641]   BP Pulse Resp Temp SpO2   102/64 62 20 98.2 °F (36.8 °C) 100 %      MAP       --         Physical Exam    Nursing note and vitals reviewed.  Constitutional: She appears well-developed and well-nourished.   HENT:   Head: Normocephalic and  atraumatic.   Nose: Nose normal. Mouth/Throat: Oropharynx is clear and moist.   Eyes: Conjunctivae and EOM are normal. Pupils are equal, round, and reactive to light.   Neck: Neck supple.   Normal range of motion.  Cardiovascular:  Normal rate, regular rhythm, normal heart sounds and intact distal pulses.           Pulmonary/Chest: Effort normal and breath sounds normal. No respiratory distress. She has no wheezes. She has no rhonchi. She has no rales. She exhibits no tenderness.   Abdominal: Abdomen is soft and flat. Bowel sounds are normal. She exhibits no distension. There is no abdominal tenderness. There is no rebound, no guarding, no tenderness at McBurney's point and negative Mead's sign. negative psoas sign  Genitourinary:    Genitourinary Comments: Pelvic exam declined per patient     Musculoskeletal:         General: Normal range of motion.      Cervical back: Normal range of motion and neck supple.     Neurological: She is alert and oriented to person, place, and time. She has normal strength and normal reflexes.   Skin: Skin is warm and dry. Capillary refill takes less than 2 seconds.   Psychiatric: She has a normal mood and affect. Her speech is normal and behavior is normal. Judgment and thought content normal.         ED Course   Procedures  Labs Reviewed   WET PREP, GENITAL - Abnormal       Result Value    WBC, Wet Prep None Seen      Clue Cells, Wet Prep Moderate (*)     Trichomonas, Wet Prep None Seen      Yeast, Wet Prep None Seen     URINALYSIS, REFLEX TO URINE CULTURE - Abnormal    Color, UA Light-Yellow      Appearance, UA Clear      Specific Gravity, UA 1.033 (*)     pH, UA 7.0      Protein, UA Trace (*)     Glucose, UA Normal      Ketones, UA Negative      Blood, UA Negative      Bilirubin, UA Negative      Urobilinogen, UA Normal      Nitrites, UA Negative      Leukocyte Esterase, UA Negative      RBC, UA 0-5      WBC, UA 0-5      Bacteria, UA None Seen      Squamous Epithelial Cells, UA  Many (*)     Mucous, UA Trace (*)     Hyaline Casts, UA None Seen     CHLAMYDIA/GONORRHOEAE(GC), PCR   POCT URINE PREGNANCY    POC Preg Test, Ur Negative       Acceptable Yes            Imaging Results    None          Medications   azithromycin tablet 1,000 mg (1,000 mg Oral Given 3/17/25 1810)   cefTRIAXone injection 500 mg (500 mg Intramuscular Given 3/17/25 1810)   LIDOcaine (PF) 10 mg/ml (1%) injection 20 mg (2 mLs Infiltration Given 3/17/25 1810)     Medical Decision Making  Differential:   BV   Vaginal candidiasis   Venereal disease   UTI    Amount and/or Complexity of Data Reviewed  Labs: ordered.    Risk  Prescription drug management.               ED Course as of 03/17/25 1817   Mon Mar 17, 2025   1816 Given strict ED return precautions. I have spoken with the patient and/or caregivers. I have explained the patient's condition, diagnoses and treatment plan based on the information available to me at this time. I have answered the patient's and/or caregiver's questions and addressed any concerns. The patient and/or caregivers have as good an understanding of the patient's diagnosis, condition and treatment plan as can be expected at this point. The vital signs have been stable. The patient's condition is stable and appropriate for discharge from the emergency department.      The patient will pursue further outpatient evaluation with the primary care physician or other designated or consulting physician as outlined in the discharge instructions. The patient and/or caregivers are agreeable to this plan of care and follow-up instructions have been explained in detail. The patient and/or caregivers have received these instructions in written format and have expressed an understanding of the discharge instructions. The patient and/or caregivers are aware that any significant change in condition or worsening of symptoms should prompt an immediate return to this or the closest emergency department  or a call to 911.   [JA]      ED Course User Index  [JA] Henrry Aggarwal Jr., FNP                       This chart is generated using a voice recognition system. Grammatical and content areas may inadvertently be generated in error. Please contact me if you find a perceive any inappropriate information in this chart. Thank you.       Clinical Impression:  Final diagnoses:  [N76.0, B96.89] Bacterial vaginosis (Primary)          ED Disposition Condition    Discharge Stable          ED Prescriptions       Medication Sig Dispense Start Date End Date Auth. Provider    metroNIDAZOLE (FLAGYL) 500 MG tablet Take 1 tablet (500 mg total) by mouth every 12 (twelve) hours. Do not drink alcohol while on this medication for 7 days 14 tablet 3/17/2025 3/24/2025 Henrry Aggarwal Jr., FNP          Follow-up Information       Follow up With Specialties Details Why Contact Info    Howie Hernandez MD Pediatrics In 3 days  [address] [facility] [address]  [phone]      [facility] Emergency Medicine In 3 days As needed, If symptoms worsen [address] [facility] [address] [phone]               [1]   Social History  Tobacco Use    Smoking status: Never    Smokeless tobacco: Never   Substance Use Topics    Alcohol use: Not Currently    Drug use: Not Currently        Henrry Aggarwal Jr., FNP  03/17/25 1665

## 2025-07-04 ENCOUNTER — HOSPITAL ENCOUNTER (EMERGENCY)
Facility: HOSPITAL | Age: 20
Discharge: HOME | End: 2025-07-04
Attending: INTERNAL MEDICINE
Payer: MEDICAID

## 2025-07-04 VITALS
OXYGEN SATURATION: 100 % | WEIGHT: 107 LBS | BODY MASS INDEX: 19.57 KG/M2 | HEART RATE: 93 BPM | DIASTOLIC BLOOD PRESSURE: 67 MMHG | TEMPERATURE: 98 F | SYSTOLIC BLOOD PRESSURE: 100 MMHG | RESPIRATION RATE: 16 BRPM

## 2025-07-04 DIAGNOSIS — B96.89 BACTERIAL VAGINOSIS: ICD-10-CM

## 2025-07-04 DIAGNOSIS — N76.0 BACTERIAL VAGINOSIS: ICD-10-CM

## 2025-07-04 DIAGNOSIS — Z34.90 EARLY STAGE OF PREGNANCY: Primary | ICD-10-CM

## 2025-07-04 LAB
B-HCG FREE SERPL-ACNC: 3457.99 MIU/ML
B-HCG UR QL: POSITIVE
BACTERIA #/AREA URNS AUTO: ABNORMAL /HPF
BILIRUB UR QL STRIP.AUTO: NEGATIVE
C TRACH DNA SPEC QL NAA+PROBE: NOT DETECTED
CLARITY UR: ABNORMAL
CLUE CELLS VAG QL WET PREP: ABNORMAL
COLOR UR AUTO: YELLOW
CTP QC/QA: YES
GLUCOSE UR QL STRIP: NORMAL
HGB UR QL STRIP: NEGATIVE
HOLD SPECIMEN: NORMAL
HYALINE CASTS #/AREA URNS LPF: ABNORMAL /LPF
KETONES UR QL STRIP: NEGATIVE
LEUKOCYTE ESTERASE UR QL STRIP: NEGATIVE
MUCOUS THREADS URNS QL MICRO: ABNORMAL /LPF
N GONORRHOEA DNA SPEC QL NAA+PROBE: NOT DETECTED
NITRITE UR QL STRIP: NEGATIVE
PH UR STRIP: 7 [PH]
PROT UR QL STRIP: ABNORMAL
RBC #/AREA URNS AUTO: ABNORMAL /HPF
SP GR UR STRIP.AUTO: 1.03 (ref 1–1.03)
SPECIMEN SOURCE: NORMAL
SQUAMOUS #/AREA URNS LPF: ABNORMAL /HPF
T VAGINALIS VAG QL WET PREP: ABNORMAL
UROBILINOGEN UR STRIP-ACNC: ABNORMAL
WBC #/AREA URNS AUTO: ABNORMAL /HPF
WBC #/AREA VAG WET PREP: ABNORMAL
YEAST SPEC QL WET PREP: ABNORMAL

## 2025-07-04 PROCEDURE — 87591 N.GONORRHOEAE DNA AMP PROB: CPT | Performed by: PHYSICIAN ASSISTANT

## 2025-07-04 PROCEDURE — 84702 CHORIONIC GONADOTROPIN TEST: CPT | Performed by: PHYSICIAN ASSISTANT

## 2025-07-04 PROCEDURE — 81001 URINALYSIS AUTO W/SCOPE: CPT | Performed by: PHYSICIAN ASSISTANT

## 2025-07-04 PROCEDURE — 87210 SMEAR WET MOUNT SALINE/INK: CPT | Performed by: PHYSICIAN ASSISTANT

## 2025-07-04 PROCEDURE — 99284 EMERGENCY DEPT VISIT MOD MDM: CPT | Mod: 25

## 2025-07-04 PROCEDURE — 81025 URINE PREGNANCY TEST: CPT | Performed by: PHYSICIAN ASSISTANT

## 2025-07-04 RX ORDER — METRONIDAZOLE 500 MG/1
250 TABLET ORAL EVERY 8 HOURS
Qty: 11 TABLET | Refills: 0 | Status: SHIPPED | OUTPATIENT
Start: 2025-07-04 | End: 2025-07-04

## 2025-07-04 RX ORDER — METRONIDAZOLE 500 MG/1
250 TABLET ORAL EVERY 8 HOURS
Qty: 11 TABLET | Refills: 0 | Status: SHIPPED | OUTPATIENT
Start: 2025-07-04 | End: 2025-07-11

## 2025-07-14 ENCOUNTER — HOSPITAL ENCOUNTER (EMERGENCY)
Facility: HOSPITAL | Age: 20
Discharge: HOME | End: 2025-07-15
Attending: EMERGENCY MEDICINE
Payer: MEDICAID

## 2025-07-14 DIAGNOSIS — N75.0 BARTHOLIN CYST: Primary | ICD-10-CM

## 2025-07-14 PROCEDURE — 99284 EMERGENCY DEPT VISIT MOD MDM: CPT

## 2025-07-14 NOTE — FIRST PROVIDER EVALUATION
"Medical screening examination initiated.  I have conducted a focused provider triage encounter, findings are as follows:    Brief history of present illness:  19 year old female presents to ER with abscess to vagina x 3 days. Patient approximately 5 weeks pregnant, . Denies fever, chills, or drainage    Vitals:    25 1850   BP: 108/68   Pulse: 102   Resp: 18   Temp: 98.7 °F (37.1 °C)   TempSrc: Oral   SpO2: 99%   Weight: 48.5 kg (107 lb)   Height: 5' 2" (1.575 m)       Pertinent physical exam:  awake and alert, nad    Brief workup plan:  exam     Preliminary workup initiated; this workup will be continued and followed by the physician or advanced practice provider that is assigned to the patient when roomed.  "

## 2025-07-15 VITALS
HEIGHT: 62 IN | OXYGEN SATURATION: 98 % | HEART RATE: 100 BPM | TEMPERATURE: 99 F | SYSTOLIC BLOOD PRESSURE: 108 MMHG | WEIGHT: 107 LBS | BODY MASS INDEX: 19.69 KG/M2 | DIASTOLIC BLOOD PRESSURE: 64 MMHG | RESPIRATION RATE: 20 BRPM

## 2025-07-15 RX ORDER — ACETAMINOPHEN 500 MG
500 TABLET ORAL EVERY 6 HOURS PRN
Qty: 30 TABLET | Refills: 0 | Status: SHIPPED | OUTPATIENT
Start: 2025-07-15

## 2025-07-15 RX ORDER — AMOXICILLIN AND CLAVULANATE POTASSIUM 875; 125 MG/1; MG/1
1 TABLET, FILM COATED ORAL 2 TIMES DAILY
Qty: 14 TABLET | Refills: 0 | Status: SHIPPED | OUTPATIENT
Start: 2025-07-15

## 2025-07-15 NOTE — DISCHARGE INSTRUCTIONS
Take antibiotics as directed. Finish entire course.    Take tylenol as needed for pain.    Do daily sitz baths or warm compression    Follow up with your OBGYN

## 2025-07-16 ENCOUNTER — HOSPITAL ENCOUNTER (EMERGENCY)
Facility: HOSPITAL | Age: 20
Discharge: HOME | End: 2025-07-16
Attending: INTERNAL MEDICINE
Payer: MEDICAID

## 2025-07-16 VITALS
TEMPERATURE: 99 F | SYSTOLIC BLOOD PRESSURE: 113 MMHG | HEIGHT: 62 IN | RESPIRATION RATE: 20 BRPM | OXYGEN SATURATION: 97 % | DIASTOLIC BLOOD PRESSURE: 71 MMHG | HEART RATE: 141 BPM | WEIGHT: 107.31 LBS | BODY MASS INDEX: 19.75 KG/M2

## 2025-07-16 DIAGNOSIS — N75.1 BARTHOLIN'S GLAND ABSCESS: ICD-10-CM

## 2025-07-16 DIAGNOSIS — Z34.91 PREGNANT AND NOT YET DELIVERED IN FIRST TRIMESTER: ICD-10-CM

## 2025-07-16 DIAGNOSIS — N75.0 BARTHOLIN GLAND CYST: Primary | ICD-10-CM

## 2025-07-16 LAB
ALBUMIN SERPL-MCNC: 3.6 G/DL (ref 3.5–5)
ALBUMIN/GLOB SERPL: 0.8 RATIO (ref 1.1–2)
ALP SERPL-CCNC: 77 UNIT/L (ref 40–150)
ALT SERPL-CCNC: 7 UNIT/L (ref 0–55)
ANION GAP SERPL CALC-SCNC: 10 MEQ/L
AST SERPL-CCNC: 14 UNIT/L (ref 11–45)
BACTERIA #/AREA URNS AUTO: ABNORMAL /HPF
BASOPHILS # BLD AUTO: 0.04 X10(3)/MCL
BASOPHILS NFR BLD AUTO: 0.5 %
BILIRUB SERPL-MCNC: 0.6 MG/DL
BILIRUB UR QL STRIP.AUTO: NEGATIVE
BUN SERPL-MCNC: 11.9 MG/DL (ref 7–18.7)
C TRACH DNA SPEC QL NAA+PROBE: NOT DETECTED
CALCIUM SERPL-MCNC: 9.2 MG/DL (ref 8.4–10.2)
CHLORIDE SERPL-SCNC: 108 MMOL/L (ref 98–107)
CLARITY UR: ABNORMAL
CLUE CELLS VAG QL WET PREP: ABNORMAL
CO2 SERPL-SCNC: 20 MMOL/L (ref 22–29)
COLOR UR AUTO: YELLOW
CREAT SERPL-MCNC: 0.61 MG/DL (ref 0.55–1.02)
CREAT/UREA NIT SERPL: 20
EOSINOPHIL # BLD AUTO: 0.05 X10(3)/MCL (ref 0–0.9)
EOSINOPHIL NFR BLD AUTO: 0.6 %
ERYTHROCYTE [DISTWIDTH] IN BLOOD BY AUTOMATED COUNT: 12.2 % (ref 11.5–17)
GFR SERPLBLD CREATININE-BSD FMLA CKD-EPI: >60 ML/MIN/1.73/M2
GLOBULIN SER-MCNC: 4.4 GM/DL (ref 2.4–3.5)
GLUCOSE SERPL-MCNC: 80 MG/DL (ref 74–100)
GLUCOSE UR QL STRIP: NORMAL
HCT VFR BLD AUTO: 36.5 % (ref 37–47)
HGB BLD-MCNC: 12.1 G/DL (ref 12–16)
HGB UR QL STRIP: ABNORMAL
HYALINE CASTS #/AREA URNS LPF: ABNORMAL /LPF
IMM GRANULOCYTES # BLD AUTO: 0.02 X10(3)/MCL (ref 0–0.04)
IMM GRANULOCYTES NFR BLD AUTO: 0.2 %
KETONES UR QL STRIP: ABNORMAL
LEUKOCYTE ESTERASE UR QL STRIP: NEGATIVE
LYMPHOCYTES # BLD AUTO: 0.72 X10(3)/MCL (ref 0.6–4.6)
LYMPHOCYTES NFR BLD AUTO: 8.4 %
MCH RBC QN AUTO: 29.4 PG (ref 27–31)
MCHC RBC AUTO-ENTMCNC: 33.2 G/DL (ref 33–36)
MCV RBC AUTO: 88.8 FL (ref 80–94)
MONOCYTES # BLD AUTO: 0.78 X10(3)/MCL (ref 0.1–1.3)
MONOCYTES NFR BLD AUTO: 9.1 %
MUCOUS THREADS URNS QL MICRO: ABNORMAL /LPF
N GONORRHOEA DNA SPEC QL NAA+PROBE: NOT DETECTED
NEUTROPHILS # BLD AUTO: 6.98 X10(3)/MCL (ref 2.1–9.2)
NEUTROPHILS NFR BLD AUTO: 81.2 %
NITRITE UR QL STRIP: NEGATIVE
NRBC BLD AUTO-RTO: 0 %
PH UR STRIP: 6.5 [PH]
PLATELET # BLD AUTO: 185 X10(3)/MCL (ref 130–400)
PMV BLD AUTO: 9.7 FL (ref 7.4–10.4)
POTASSIUM SERPL-SCNC: 3.5 MMOL/L (ref 3.5–5.1)
PROT SERPL-MCNC: 8 GM/DL (ref 6.4–8.3)
PROT UR QL STRIP: ABNORMAL
RBC # BLD AUTO: 4.11 X10(6)/MCL (ref 4.2–5.4)
RBC #/AREA URNS AUTO: ABNORMAL /HPF
SODIUM SERPL-SCNC: 138 MMOL/L (ref 136–145)
SP GR UR STRIP.AUTO: 1.03 (ref 1–1.03)
SPECIMEN SOURCE: NORMAL
SQUAMOUS #/AREA URNS LPF: ABNORMAL /HPF
T VAGINALIS VAG QL WET PREP: ABNORMAL
UROBILINOGEN UR STRIP-ACNC: ABNORMAL
WBC # BLD AUTO: 8.59 X10(3)/MCL (ref 4.5–11.5)
WBC #/AREA URNS AUTO: ABNORMAL /HPF
WBC #/AREA VAG WET PREP: ABNORMAL
YEAST SPEC QL WET PREP: ABNORMAL

## 2025-07-16 PROCEDURE — 85025 COMPLETE CBC W/AUTO DIFF WBC: CPT | Performed by: INTERNAL MEDICINE

## 2025-07-16 PROCEDURE — 80053 COMPREHEN METABOLIC PANEL: CPT | Performed by: INTERNAL MEDICINE

## 2025-07-16 PROCEDURE — 87210 SMEAR WET MOUNT SALINE/INK: CPT | Performed by: INTERNAL MEDICINE

## 2025-07-16 PROCEDURE — 63600175 PHARM REV CODE 636 W HCPCS

## 2025-07-16 PROCEDURE — 87186 SC STD MICRODIL/AGAR DIL: CPT

## 2025-07-16 PROCEDURE — 87591 N.GONORRHOEAE DNA AMP PROB: CPT | Performed by: INTERNAL MEDICINE

## 2025-07-16 PROCEDURE — 81001 URINALYSIS AUTO W/SCOPE: CPT | Performed by: INTERNAL MEDICINE

## 2025-07-16 PROCEDURE — 99283 EMERGENCY DEPT VISIT LOW MDM: CPT

## 2025-07-16 RX ORDER — LIDOCAINE HYDROCHLORIDE 10 MG/ML
10 INJECTION, SOLUTION INFILTRATION; PERINEURAL
Status: COMPLETED | OUTPATIENT
Start: 2025-07-16 | End: 2025-07-16

## 2025-07-16 RX ADMIN — LIDOCAINE HYDROCHLORIDE 10 ML: 10 INJECTION, SOLUTION INFILTRATION; PERINEURAL at 09:07

## 2025-07-17 NOTE — CONSULTS
U OB/GYN NOTE  [facility]  [address] [place] [address]  Phone: [phone]  Fax: [phone]    U Gynecology Consult - Resident Note    Consulting Physician: Dr. Andre  Reason for consult: Labial cyst  Subjective:        HPI:   Kelby Houston is an 19 y.o.  woman with LMP 5/3/2025, and estimated GA of 6w5d by 5w US, presenting for labial cystic swelling.    She endorses initial onset of small cystic left labial mass (inferior left labia) one week ago. Gradually, has been growing in size and becoming increasingly tender to touch. She has never had this occur before.    Denies any vaginal bleeding, abdominal pain, cramps, nausea, vomiting, fever, or chills.    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  STD history: Reports h/o STI (unsure which one) endorses tx completed.  Pap smear history: Never done, not indicated.  Denies prior GYN concerns.    Allergies  Review of patient's allergies indicates:  No Known Allergies    Family History  No family history on file.    Social History  Social History[1]    Overview of active problems  There are no active problems to display for this patient.      Medications  Home medications  Prescriptions Prior to Admission[2]    Current Inpatient medications  Current Medications[3]       Objective:     Vitals:    25   BP: 113/71   Pulse: (!) 141   Resp: 20   Temp: 98.5 °F (36.9 °C)   TempSrc: Oral   SpO2: 97%   Weight: 48.7 kg (107 lb 4.8 oz)  "  Height: 5' 2" (1.575 m)     Body mass index is 19.63 kg/m².    No intake/output data recorded.    Physical Exam:  General: alert and oriented, in no acute distress   Abdomen: Soft, non-distended, non-tender   DVT Evaluation: No cords or calf tenderness.  No significant calf/ankle edema.   Extremities: Normal, atraumatic, non-edematous   External genitalia: External female genitalia with exam notable for large 4 cm fluctuant cyst with origin slightly proximal to introitus at 4 o'clock position on left vaginal sidewall/labia (c/w bartholin gland cyst). Tender to touch. No surrounding erythema. No warmth to touch   Note: RN chaperone present for entirety of exam.    PROCEDURE:    Patient positioned dorsally with legs in butterfly position. Thereafter, area prepped with betadine sticks x3. Thereafter 5 cc of 1% lidocaine without epinephrine injected submucosally at site of bartholin cyst within introitus. A small 5 mm incision made with 11 scalpel, with purulent discharge and blood return. Wound culture collected. A hemostat was used to open up loculations of bartholin gland cyst internally until fully drained and no further fluctuance appreciated. Thereafter, a Word catheter was successfully inserted into bartholin gland cyst space and inflated with 3 cc of normal saline.    All procedural equipment removed and counts correct. Patient tolerated procedure well.    Results:     LABS  Trended:  Recent Labs   Lab 07/16/25  2044   WBC 8.59   HGB 12.1   HCT 36.5*      MCV 88.8   RDW 12.2      K 3.5   *   CO2 20*   BUN 11.9   CREATININE 0.61   GLU 80   CALCIUM 9.2   PROT 8.0   ALBUMIN 3.6   BILITOT 0.6   AST 14   ALT 7   ALKPHOS 77     Recent Results (from the past 24 hours)   Urinalysis, Reflex to Urine Culture    Collection Time: 07/16/25  8:35 PM    Specimen: Urine   Result Value Ref Range    Color, UA Yellow Yellow, Light-Yellow, Dark Yellow, Yuli, Straw    Appearance, UA Turbid (A) Clear    Specific " Gravity, UA 1.035 (H) 1.005 - 1.030    pH, UA 6.5 5.0 - 8.5    Protein, UA 1+ (A) Negative    Glucose, UA Normal Negative, Normal    Ketones, UA 2+ (A) Negative    Blood, UA Trace (A) Negative    Bilirubin, UA Negative Negative    Urobilinogen, UA 1+ (A) 0.2, 1.0, Normal    Nitrites, UA Negative Negative    Leukocyte Esterase, UA Negative Negative    RBC, UA 0-5 None Seen, 0-2, 3-5, 0-5 /HPF    WBC, UA 6-10 (A) None Seen, 0-2, 3-5, 0-5 /HPF    Bacteria, UA Trace (A) None Seen /HPF    Squamous Epithelial Cells, UA Many (A) None Seen /HPF    Mucous, UA Many (A) None Seen /LPF    Hyaline Casts, UA None Seen None Seen /lpf   Wet Prep, Genital    Collection Time: 07/16/25  8:39 PM    Specimen: Cervicovaginal; Genital   Result Value Ref Range    WBC, Wet Prep Few (A) None Seen    Clue Cells, Wet Prep None Seen None Seen    Trichomonas, Wet Prep None Seen None Seen    Yeast, Wet Prep None Seen None Seen   Comprehensive metabolic panel    Collection Time: 07/16/25  8:44 PM   Result Value Ref Range    Sodium 138 136 - 145 mmol/L    Potassium 3.5 3.5 - 5.1 mmol/L    Chloride 108 (H) 98 - 107 mmol/L    CO2 20 (L) 22 - 29 mmol/L    Glucose 80 74 - 100 mg/dL    Blood Urea Nitrogen 11.9 7.0 - 18.7 mg/dL    Creatinine 0.61 0.55 - 1.02 mg/dL    Calcium 9.2 8.4 - 10.2 mg/dL    Protein Total 8.0 6.4 - 8.3 gm/dL    Albumin 3.6 3.5 - 5.0 g/dL    Globulin 4.4 (H) 2.4 - 3.5 gm/dL    Albumin/Globulin Ratio 0.8 (L) 1.1 - 2.0 ratio    Bilirubin Total 0.6 <=1.5 mg/dL    ALP 77 40 - 150 unit/L    ALT 7 0 - 55 unit/L    AST 14 11 - 45 unit/L    eGFR >60 mL/min/1.73/m2    Anion Gap 10.0 mEq/L    BUN/Creatinine Ratio 20    CBC with Differential    Collection Time: 07/16/25  8:44 PM   Result Value Ref Range    WBC 8.59 4.50 - 11.50 x10(3)/mcL    RBC 4.11 (L) 4.20 - 5.40 x10(6)/mcL    Hgb 12.1 12.0 - 16.0 g/dL    Hct 36.5 (L) 37.0 - 47.0 %    MCV 88.8 80.0 - 94.0 fL    MCH 29.4 27.0 - 31.0 pg    MCHC 33.2 33.0 - 36.0 g/dL    RDW 12.2 11.5 - 17.0 %     Platelet 185 130 - 400 x10(3)/mcL    MPV 9.7 7.4 - 10.4 fL    Neut % 81.2 %    Lymph % 8.4 %    Mono % 9.1 %    Eos % 0.6 %    Basophil % 0.5 %    Imm Grans % 0.2 %    Neut # 6.98 2.1 - 9.2 x10(3)/mcL    Lymph # 0.72 0.6 - 4.6 x10(3)/mcL    Mono # 0.78 0.1 - 1.3 x10(3)/mcL    Eos # 0.05 0 - 0.9 x10(3)/mcL    Baso # 0.04 <=0.2 x10(3)/mcL    Imm Gran # 0.02 0.00 - 0.04 x10(3)/mcL    NRBC% 0.0 %      Wound Culture Collected: pending     Assessment:     Kelby Houston is a 19 y.o.  female at 6w5d with bartholin gland cyst. Pt is currently hemodynamically and clinically stable.    [facility] consulted for assistance with bartholin gland cyst.      Recommendations:     #Bartholin Gland Cyst:  Onset 1 week ago and worsening swelling, erythema, tenderness to touch. Afebrile, no leukocytosis. Clinically and hemodynamically stable.  On physical exam 4 cm fluctuant mass at location of bartholin gland near left labia; c/w bartholin gland cyst.  Incision and drainage performed with return of sanguinous and purulent discharge.  Wound culture collected.  Word catheter placed in space of bartholin cyst.   Patient was counseled on bartholin gland cyst, maintaining vulvar care, avoid irritants, wash with running water (avoid baths/soaking). Discussed word catheter can remain in situ until clinic follow up, but may spontaneously expulse earlier.  Will schedule for 2 week outpatient follow up in clinic and likely word catheter removal.    Discussed with staff, Dr. Sheryl Kim D.O.  PGY-2  Obstetrics & Gynecology  [facility]              [1]   Social History  Tobacco Use    Smoking status: Never    Smokeless tobacco: Never   Vaping Use    Vaping status: Never Used   Substance Use Topics    Alcohol use: Not Currently    Drug use: Not Currently   [2] (Not in a hospital admission)   [3] No current facility-administered medications for this encounter.    Current Outpatient Medications:      acetaminophen (TYLENOL) 500 MG tablet, Take 1 tablet (500 mg total) by mouth every 6 (six) hours as needed for Pain., Disp: 30 tablet, Rfl: 0    amoxicillin-clavulanate 875-125mg (AUGMENTIN) 875-125 mg per tablet, Take 1 tablet by mouth 2 (two) times daily., Disp: 14 tablet, Rfl: 0    diclofenac (VOLTAREN) 75 MG EC tablet, Take 1 tablet (75 mg total) by mouth 2 (two) times daily as needed (Pain)., Disp: 20 tablet, Rfl: 0

## 2025-07-17 NOTE — ED PROVIDER NOTES
Encounter Date: 2025       History     Chief Complaint   Patient presents with    vaginal abscess     Pt is 5 weeks pregnant () states has an abscess on her vaginal area has been there for 1 week     Tachycardia     Presents with left vaginal labia swelling and pain for the last week. Currently pregnant with LMP 25, , her OB will be Dr. Concepcion. Denies chronic medical problems. States noticed swelling and is getting bigger and painful, no prior episodes. Denies bleeding or pelvic pain    The history is provided by the patient and medical records.     Review of patient's allergies indicates:  No Known Allergies  Past Medical History:   Diagnosis Date    Known health problems: none      Past Surgical History:   Procedure Laterality Date    none       No family history on file.  Social History[1]  Review of Systems   Genitourinary:  Positive for vaginal pain.       Physical Exam     Initial Vitals [25]   BP Pulse Resp Temp SpO2   113/71 (!) 141 20 98.5 °F (36.9 °C) 97 %      MAP       --         Physical Exam    Nursing note and vitals reviewed.  Constitutional: She appears well-developed and well-nourished. No distress.   HENT:   Head: Normocephalic and atraumatic. Mouth/Throat: Oropharynx is clear and moist.   Eyes: Conjunctivae are normal. Pupils are equal, round, and reactive to light.   Neck: Neck supple. No JVD present.   Normal range of motion.  Cardiovascular:  Regular rhythm, normal heart sounds and intact distal pulses.           tachycardic   Pulmonary/Chest: Breath sounds normal.   Abdominal: Abdomen is soft. Bowel sounds are normal. She exhibits no distension. There is no abdominal tenderness. There is no rebound and no guarding.   Genitourinary:    Genitourinary Comments: Left labia majora swelling with associated cystic lesion among inferior area, some vaginal white discharge     Musculoskeletal:         General: No edema. Normal range of motion.      Cervical back: Normal  range of motion and neck supple.     Neurological: She is alert and oriented to person, place, and time. She has normal strength. GCS score is 15. GCS eye subscore is 4. GCS verbal subscore is 5. GCS motor subscore is 6.   Skin: Skin is warm and dry. No rash noted.   Psychiatric: Thought content normal.         ED Course   Procedures  Labs Reviewed   WET PREP, GENITAL - Abnormal       Result Value    WBC, Wet Prep Few (*)     Clue Cells, Wet Prep None Seen      Trichomonas, Wet Prep None Seen      Yeast, Wet Prep None Seen     URINALYSIS, REFLEX TO URINE CULTURE - Abnormal    Color, UA Yellow      Appearance, UA Turbid (*)     Specific Gravity, UA 1.035 (*)     pH, UA 6.5      Protein, UA 1+ (*)     Glucose, UA Normal      Ketones, UA 2+ (*)     Blood, UA Trace (*)     Bilirubin, UA Negative      Urobilinogen, UA 1+ (*)     Nitrites, UA Negative      Leukocyte Esterase, UA Negative      RBC, UA 0-5      WBC, UA 6-10 (*)     Bacteria, UA Trace (*)     Squamous Epithelial Cells, UA Many (*)     Mucous, UA Many (*)     Hyaline Casts, UA None Seen     COMPREHENSIVE METABOLIC PANEL - Abnormal    Sodium 138      Potassium 3.5      Chloride 108 (*)     CO2 20 (*)     Glucose 80      Blood Urea Nitrogen 11.9      Creatinine 0.61      Calcium 9.2      Protein Total 8.0      Albumin 3.6      Globulin 4.4 (*)     Albumin/Globulin Ratio 0.8 (*)     Bilirubin Total 0.6      ALP 77      ALT 7      AST 14      eGFR >60      Anion Gap 10.0      BUN/Creatinine Ratio 20     CBC WITH DIFFERENTIAL - Abnormal    WBC 8.59      RBC 4.11 (*)     Hgb 12.1      Hct 36.5 (*)     MCV 88.8      MCH 29.4      MCHC 33.2      RDW 12.2      Platelet 185      MPV 9.7      Neut % 81.2      Lymph % 8.4      Mono % 9.1      Eos % 0.6      Basophil % 0.5      Imm Grans % 0.2      Neut # 6.98      Lymph # 0.72      Mono # 0.78      Eos # 0.05      Baso # 0.04      Imm Gran # 0.02      NRBC% 0.0     CHLAMYDIA/GONORRHOEAE(GC), PCR   WOUND CULTURE ([facility])    CBC W/ AUTO DIFFERENTIAL    Narrative:     The following orders were created for panel order CBC auto differential.  Procedure                               Abnormality         Status                     ---------                               -----------         ------                     CBC with Differential[[id number]]       Abnormal            Final result                 Please view results for these tests on the individual orders.          Imaging Results    None          Medications   LIDOcaine HCL 10 mg/ml (1%) injection 10 mL (10 mLs Other Given 7/16/25 5974)     Medical Decision Making  Amount and/or Complexity of Data Reviewed  Labs: ordered. Decision-making details documented in ED Course.  Discussion of management or test interpretation with external provider(s): 9:01 PM Consult: I discussed the case with Dr. Day (Gyn). Agrees with current management.   Recommends will evaluate in ED                                            Clinical Impression:  Final diagnoses:  [N75.1] Bartholin's gland abscess  [Z34.91] Pregnant and not yet delivered in first trimester          ED Disposition Condition    Discharge Stable          ED Prescriptions    None       Follow-up Information       Follow up With Specialties Details Why Contact Info    [facility] Emergency Medicine Go to  If symptoms worsen [address] [facility] [address] [phone]    Karolina Alejo MD Obstetrics and Gynecology, Gynecology Schedule an appointment as soon as possible for a visit in 2 weeks  [address] [facility]  Gynecology Clinic  [facility] [address] [phone]      Zaid Concepcion MD Obstetrics and Gynecology Schedule an appointment as soon as possible for a visit in 2 weeks  [address] [facility]  [address]  [facility] [address] [phone]                     [1]   Social History  Tobacco Use    Smoking status: Never    Smokeless tobacco: Never   Vaping  Use    Vaping status: Never Used   Substance Use Topics    Alcohol use: Not Currently    Drug use: Not Currently        Raj Kumar MD  07/16/25 2829

## 2025-07-18 ENCOUNTER — TELEPHONE (OUTPATIENT)
Dept: GYNECOLOGY | Facility: CLINIC | Age: 20
End: 2025-07-18
Payer: MEDICAID

## 2025-07-18 NOTE — TELEPHONE ENCOUNTER
----- Message from MAKAYLA Sexton sent at 7/17/2025  7:45 AM CDT -----  Regarding: FW: Schedule 2 week ED F/U bartholin    ----- Message -----  From: Mitul Kim MD  Sent: 7/16/2025  10:29 PM CDT  To: [facility] Clinical Support Staff  Subject: Schedule 2 week ED F/U bartholin                 Good evening,    Could we please schedule Ms. Houston for an ED follow up within next 2 weeks. Seen in ED for bartholin cyst, with word catheter removal in clinic.    Thank you,    Mitul Kim D.O.  PGY-2  Obstetrics & Gynecology  [facility]

## 2025-07-20 LAB — BACTERIA WND CULT: ABNORMAL

## 2025-07-21 ENCOUNTER — TELEPHONE (OUTPATIENT)
Dept: GYNECOLOGY | Facility: CLINIC | Age: 20
End: 2025-07-21
Payer: MEDICAID

## 2025-07-21 NOTE — ED PROVIDER NOTES
Encounter Date: 2025       History     Chief Complaint   Patient presents with    Groin Pain     Reports boil/lump to L vaginal lip x3 days with redness and swelling. 5 weeks pregnant,      See MDM    The history is provided by the patient.     Review of patient's allergies indicates:  No Known Allergies  Past Medical History:   Diagnosis Date    Known health problems: none      Past Surgical History:   Procedure Laterality Date    none       No family history on file.  Social History[1]  Review of Systems   Constitutional:  Negative for chills and fever.   Respiratory:  Negative for chest tightness and shortness of breath.    Cardiovascular:  Negative for chest pain and palpitations.   Gastrointestinal:  Negative for abdominal pain, diarrhea and vomiting.   Genitourinary:  Negative for dysuria and frequency.        Labial pain   Musculoskeletal:  Negative for arthralgias and myalgias.   Neurological:  Negative for dizziness and syncope.   All other systems reviewed and are negative.      Physical Exam     Initial Vitals [25 1850]   BP Pulse Resp Temp SpO2   108/68 102 18 98.7 °F (37.1 °C) 99 %      MAP       --         Physical Exam    Vitals reviewed.  Constitutional: She appears well-developed and well-nourished. No distress.   HENT:   Head: Normocephalic and atraumatic.   Eyes: EOM are normal. Pupils are equal, round, and reactive to light.   Neck: Neck supple.   Normal range of motion.  Cardiovascular:  Normal rate, regular rhythm, normal heart sounds and intact distal pulses.           Pulmonary/Chest: Breath sounds normal.   Abdominal: Abdomen is soft. Bowel sounds are normal. There is no abdominal tenderness.   Genitourinary:    Genitourinary Comments: Small area of swelling with tenderness, no redness, drainage present. Induration throughout          Musculoskeletal:         General: Normal range of motion.      Cervical back: Normal range of motion and neck supple.     Neurological: She is  alert and oriented to person, place, and time.   Skin: Skin is warm and dry.   Psychiatric: She has a normal mood and affect.         ED Course   Procedures  Labs Reviewed - No data to display       Imaging Results    None          Medications - No data to display  Medical Decision Making  19 year old female, approx 5 weeks pregnant,  presents to ED due to labial pain x 3 days. She denies any abdominal pain, fever, dysuria, vaginal discharge, or vaginal bleeding. She has not seen an OB yet. She has not tried warm compresses or any other treatments at home    On exam there is a small area of swelling and tenderness to the inferior right labia without erythema or drainage. No fluctuance present.     Will not perform I&D due to small size and induration, will opt for antibiotic therapy and encourage warm compress and soaks and follow up with OB.    Discussed follow up and ED return precautions. Pt verbalized understanding. All questions answered      Risk  OTC drugs.  Prescription drug management.      Additional MDM:   Differential Diagnosis:   Other: The following diagnoses were also considered and will be evaluated: abscess, bartholin gland cyst and STI.                                       Clinical Impression:  Final diagnoses:  [N75.0] Bartholin cyst (Primary)          ED Disposition Condition    Discharge Stable          ED Prescriptions       Medication Sig Dispense Start Date End Date Auth. Provider    amoxicillin-clavulanate 875-125mg (AUGMENTIN) 875-125 mg per tablet Take 1 tablet by mouth 2 (two) times daily. 14 tablet 7/15/2025 -- Nithya Wilkins PA-C    acetaminophen (TYLENOL) 500 MG tablet Take 1 tablet (500 mg total) by mouth every 6 (six) hours as needed for Pain. 30 tablet 7/15/2025 -- Nithya Wilkins PA-C          Follow-up Information       Follow up With Specialties Details Why Contact Info    Primary Care Provider  Schedule an appointment as soon as possible for a visit  Call  [phone] to be set up with a primary care provider if you do not have one     Your OBGYN  Schedule an appointment as soon as possible for a visit                      [1]   Social History  Tobacco Use    Smoking status: Never    Smokeless tobacco: Never   Vaping Use    Vaping status: Never Used   Substance Use Topics    Alcohol use: Not Currently    Drug use: Not Currently        Nithya Wilkins PA-C  07/21/25 1529

## 2025-07-21 NOTE — TELEPHONE ENCOUNTER
----- Message from MAKAYLA Sexton sent at 7/17/2025  7:45 AM CDT -----  Regarding: FW: Schedule 2 week ED F/U bartholin    ----- Message -----  From: Mitul Kim MD  Sent: 7/16/2025  10:29 PM CDT  To: [facility] Gynecology Clinical Support Staff  Subject: Schedule 2 week ED F/U bartholin                 Good evening,    Could we please schedule Ms. Houston for an ED follow up within next 2 weeks. Seen in ED for bartholin cyst, with word catheter removal in clinic.    Thank you,    Mitul Kim D.O.  PGY-2  Obstetrics & Gynecology  [facility]

## 2025-07-30 ENCOUNTER — OFFICE VISIT (OUTPATIENT)
Dept: GYNECOLOGY | Facility: CLINIC | Age: 20
End: 2025-07-30
Payer: MEDICAID

## 2025-07-30 VITALS
OXYGEN SATURATION: 100 % | WEIGHT: 113.38 LBS | HEIGHT: 62 IN | TEMPERATURE: 98 F | SYSTOLIC BLOOD PRESSURE: 111 MMHG | DIASTOLIC BLOOD PRESSURE: 72 MMHG | HEART RATE: 86 BPM | BODY MASS INDEX: 20.86 KG/M2

## 2025-07-30 DIAGNOSIS — N75.1 BARTHOLIN'S GLAND ABSCESS: ICD-10-CM

## 2025-07-30 DIAGNOSIS — Z30.9 ENCOUNTER FOR CONTRACEPTIVE MANAGEMENT, UNSPECIFIED TYPE: ICD-10-CM

## 2025-07-30 DIAGNOSIS — O03.4 INCOMPLETE ABORTION: Primary | ICD-10-CM

## 2025-07-30 PROCEDURE — 99213 OFFICE O/P EST LOW 20 MIN: CPT | Mod: PBBFAC,TH

## 2025-07-30 PROCEDURE — 88305 TISSUE EXAM BY PATHOLOGIST: CPT | Mod: TC

## 2025-07-30 PROCEDURE — 96372 THER/PROPH/DIAG INJ SC/IM: CPT | Mod: PBBFAC

## 2025-07-30 RX ORDER — MEDROXYPROGESTERONE ACETATE 150 MG/ML
150 INJECTION, SUSPENSION INTRAMUSCULAR ONCE
Qty: 1 ML | Refills: 0 | Status: SHIPPED | OUTPATIENT
Start: 2025-07-30 | End: 2025-07-30

## 2025-07-30 RX ORDER — MEDROXYPROGESTERONE ACETATE 150 MG/ML
150 INJECTION, SUSPENSION INTRAMUSCULAR
Status: COMPLETED | OUTPATIENT
Start: 2025-07-30 | End: 2025-07-30

## 2025-07-30 RX ORDER — MISOPROSTOL 200 UG/1
800 TABLET ORAL EVERY 6 HOURS
Qty: 4 TABLET | Refills: 0 | Status: SHIPPED | OUTPATIENT
Start: 2025-07-30 | End: 2025-07-31

## 2025-07-30 RX ADMIN — MEDROXYPROGESTERONE ACETATE 150 MG: 150 INJECTION, SUSPENSION INTRAMUSCULAR at 11:07

## 2025-07-30 NOTE — PROGRESS NOTES
"  [facility] GYNECOLOGY CLINIC NOTE     Kelby Houston is a 19 y.o.  presenting to GYN clinic for a 2 week follow up appointment after ED visit on  2/2 Bartholin's abscess with word catheter placement. Of note patient approx 7wga at that time, confirmed IUP but only with GS, no YS or fetal pole.     Today she reports:   - Word catheter still in place, but expulsed from vagina 2/2 heavy vaginal bleeding which began on Saturday.  - She believes she is having a miscarriage. Vaginal bleeding heavy originally, has lightened with time, abdominal cramping associated.   - She reports no pain in the bartholin's since ED visit. No purulent drainage.  - Does not desire to be pregnant for some time, desires contraception.     Gynecology  OB History          1    Para        Term                AB        Living             SAB        IAB        Ectopic        Multiple        Live Births                    Past Medical History:   Diagnosis Date    Known health problems: none       Past Surgical History:   Procedure Laterality Date    none        Current Outpatient Medications   Medication Instructions    acetaminophen (TYLENOL) 500 mg, Oral, Every 6 hours PRN    amoxicillin-clavulanate 875-125mg (AUGMENTIN) 875-125 mg per tablet 1 tablet, Oral, 2 times daily    miSOPROStoL (CYTOTEC) 800 mcg, Oral, Every 6 hours     Social History[1]    Review of Systems  Pertinent items are noted in HPI.     Objective:     /72   Pulse 86   Temp 97.5 °F (36.4 °C)   Ht 5' 2" (1.575 m)   Wt 51.4 kg (113 lb 6.4 oz)   LMP 2025   SpO2 100%   BMI 20.74 kg/m²     Physical Exam:  Gen: Well-nourished, well-developed female appearing stated age. Alert, cooperative, in no acute distress.  CV: regular rate  Chest: no increased work of breathing  Abdomen: Soft, non-tender, no masses.  Extrem: Extremities normal, atraumatic  External genitalia: Normal female genetalia without lesion, discharge or tenderness.  Speculum Exam: " Vaginal vault with moderate amount of blood. Products noted at cervical os and extending into the vagina. Grasped with ring forceps and removed. Sent for pathology. Cervical os 1 cm open, mild bleeding from os.  Bimanual Exam: No cervical motion tenderness. Cervix 1 cm dilated. Uterus freely mobile. 8 cm size uterus. No adnexal masses. Non tender exam.     Note: RN chaperone present for entirety of exam.    Relevant Imaging:  TVUS :   FINDINGS:  Suspected early intrauterine gestational sac with mean sac diameter 4.4 mm.  No yolk sac or fetal pole appreciated.  Average ultrasound age by mean sac diameter 5 weeks 1 day +/-3 days with RADHA 2026.  Tiny adjacent area of subchorionic hemorrhage.     Right ovary has normal appearance.  Left ovary contains a 18 mm corpus luteum.     No significant pelvic ascites.     Measurements:     Uterus: 8.9 x 4.7 x 6.2 cm     Right ovary: 2.9 x 3.0 x 2.2 cm     Left ovary: 3.5 x 2.6 x 3.1 cm     Impression:     Early intrauterine pregnancy with average ultrasound age 5 weeks 1 day by mean sac diameter.     No significant discrepancy between my interpretation and the preliminary radiology report.    Bedside TVUS performed today with notable products in the cervix and lower uterine segment.       Assessment:       19 y.o.  here for follow up bartholin's cyst now with incomplete .    1. Incomplete   miSOPROStoL (CYTOTEC) 200 MCG Tab    Specimen to Pathology Gynecology and Obstetrics      2. Encounter for contraceptive management, unspecified type  medroxyPROGESTERone (DEPO-PROVERA) injection 150 mg    DISCONTINUED: medroxyPROGESTERone (DEPO-PROVERA) 150 mg/mL Syrg      3. Bartholin's gland abscess          Plan:     Problem List Items Addressed This Visit       Incomplete  - Primary    Patient with previously US confirmed IUP, bleeding since Saturday.  Products removed from cervix and vagina today. Sent for pathology.   TVUS with retained products.  Counseled patient on expectant vs medical management. She desires cytotec. Rx sent to pharmacy.   Counseled and return precautions given.     She desires contraception, counseled. Depo today, [email or web address] information given.         Relevant Medications    miSOPROStoL (CYTOTEC) 200 MCG Tab    Other Relevant Orders    Specimen to Pathology Gynecology and Obstetrics    Bartholin's gland abscess    Well healing, Word catheter still on place. Non tender on palpation today.  Given size of abscess at time of placement, will leave word catheter in place at this time.           Other Visit Diagnoses         Encounter for contraceptive management, unspecified type        Relevant Medications    medroxyPROGESTERone (DEPO-PROVERA) injection 150 mg (Completed)          Return to clinic 2 weeks for Bartholin's reassessment and f/up incomplete  as well.     Discussed patient and plan with Dr. Alejo.     Fatmata Pastor,   [employer] [facility] PGY-4       [1]   Social History  Tobacco Use    Smoking status: Never    Smokeless tobacco: Never   Vaping Use    Vaping status: Never Used   Substance Use Topics    Alcohol use: Not Currently    Drug use: Not Currently

## 2025-07-30 NOTE — PROGRESS NOTES
Pt given depo in Left Dorsalgluteal per Dr. Pastor order. Pt having a miscarriage that was confirmed by an US. Pt tolerated well and left in stable condition. KG, CMA

## 2025-07-30 NOTE — LETTER
July 30, 2025      [facility]  [address] [address]  Phone: [phone]       Patient: Kelby Houston   YOB: 2005  Date of Visit: 07/30/2025    To Whom It May Concern:    Shonna Houston  was at [facility] on 07/30/2025. The patient may return to work on 08/04/25 with no restrictions. If you have any questions or concerns, or if I can be of further assistance, please do not hesitate to contact me.    Sincerely,    Dr. Pastor

## 2025-07-30 NOTE — ASSESSMENT & PLAN NOTE
Well healing, Word catheter still on place. Non tender on palpation today.  Given size of abscess at time of placement, will leave word catheter in place at this time.

## 2025-07-30 NOTE — ASSESSMENT & PLAN NOTE
Patient with previously US confirmed IUP, bleeding since Saturday.  Products removed from cervix and vagina today. Sent for pathology.   TVUS with retained products. Counseled patient on expectant vs medical management. She desires cytotec. Rx sent to pharmacy.   Counseled and return precautions given.     She desires contraception, counseled. Depo today, [email or web address] information given.

## 2025-07-31 LAB
ESTROGEN SERPL-MCNC: NORMAL PG/ML
INSULIN SERPL-ACNC: NORMAL U[IU]/ML
LAB AP GROSS DESCRIPTION: NORMAL
LAB AP REPORT FOOTNOTES: NORMAL

## 2025-08-20 ENCOUNTER — OFFICE VISIT (OUTPATIENT)
Dept: GYNECOLOGY | Facility: CLINIC | Age: 20
End: 2025-08-20
Payer: MEDICAID

## 2025-08-20 VITALS
BODY MASS INDEX: 20.43 KG/M2 | WEIGHT: 111 LBS | HEIGHT: 62 IN | SYSTOLIC BLOOD PRESSURE: 106 MMHG | HEART RATE: 70 BPM | TEMPERATURE: 99 F | DIASTOLIC BLOOD PRESSURE: 65 MMHG | OXYGEN SATURATION: 100 %

## 2025-08-20 DIAGNOSIS — Z30.9 ENCOUNTER FOR CONTRACEPTIVE MANAGEMENT, UNSPECIFIED TYPE: ICD-10-CM

## 2025-08-20 DIAGNOSIS — O03.4 INCOMPLETE ABORTION: ICD-10-CM

## 2025-08-20 DIAGNOSIS — N75.1 BARTHOLIN'S GLAND ABSCESS: Primary | ICD-10-CM

## 2025-08-20 PROCEDURE — 99213 OFFICE O/P EST LOW 20 MIN: CPT | Mod: PBBFAC,TH
